# Patient Record
Sex: FEMALE | Race: WHITE | HISPANIC OR LATINO | Employment: OTHER | URBAN - METROPOLITAN AREA
[De-identification: names, ages, dates, MRNs, and addresses within clinical notes are randomized per-mention and may not be internally consistent; named-entity substitution may affect disease eponyms.]

---

## 2017-06-19 ENCOUNTER — TELEPHONE (OUTPATIENT)
Dept: VASCULAR SURGERY | Facility: CLINIC | Age: 77
End: 2017-06-19

## 2017-06-19 NOTE — TELEPHONE ENCOUNTER
----- Message from Chip Espinal sent at 6/19/2017 10:30 AM CDT -----  Contact: Mena- Daughter  Caller said [place] is going out of [place] and wants pt to see Dr. Espinosa for check up. Please call Mena at [phone]

## 2017-07-03 ENCOUNTER — TELEPHONE (OUTPATIENT)
Dept: VASCULAR SURGERY | Facility: CLINIC | Age: 77
End: 2017-07-03

## 2017-07-03 DIAGNOSIS — N18.6 END STAGE RENAL FAILURE ON DIALYSIS: Primary | ICD-10-CM

## 2017-07-03 DIAGNOSIS — Z99.2 END STAGE RENAL FAILURE ON DIALYSIS: Primary | ICD-10-CM

## 2017-07-03 NOTE — TELEPHONE ENCOUNTER
----- Message from Marlon Proctor sent at 7/3/2017  2:02 PM CDT -----  Contact: Mena//Daughter  Caller states that (s)he needs to speak with nurse in ref to scheduling an appt for the pt//please call back at [phone]//thank you

## 2017-07-14 ENCOUNTER — HOSPITAL ENCOUNTER (OUTPATIENT)
Dept: VASCULAR SURGERY | Facility: CLINIC | Age: 77
Discharge: HOME | End: 2017-07-14
Payer: MEDICARE

## 2017-07-14 ENCOUNTER — OFFICE VISIT (OUTPATIENT)
Dept: VASCULAR SURGERY | Facility: CLINIC | Age: 77
End: 2017-07-14
Payer: MEDICARE

## 2017-07-14 VITALS
RESPIRATION RATE: 18 BRPM | WEIGHT: 124.31 LBS | BODY MASS INDEX: 24.41 KG/M2 | HEIGHT: 60 IN | SYSTOLIC BLOOD PRESSURE: 161 MMHG | TEMPERATURE: 98 F | HEART RATE: 69 BPM | DIASTOLIC BLOOD PRESSURE: 66 MMHG

## 2017-07-14 DIAGNOSIS — Z01.818 PRE-OP EVALUATION: Primary | ICD-10-CM

## 2017-07-14 DIAGNOSIS — Z99.2 END STAGE RENAL FAILURE ON DIALYSIS: ICD-10-CM

## 2017-07-14 DIAGNOSIS — T82.858D STENOSIS OF ARTERIOVENOUS DIALYSIS FISTULA, SUBSEQUENT ENCOUNTER: ICD-10-CM

## 2017-07-14 DIAGNOSIS — I77.0 AVF (ARTERIOVENOUS FISTULA): Primary | ICD-10-CM

## 2017-07-14 DIAGNOSIS — N18.6 END STAGE RENAL FAILURE ON DIALYSIS: ICD-10-CM

## 2017-07-14 PROCEDURE — 99213 OFFICE O/P EST LOW 20 MIN: CPT | Mod: PBBFAC | Performed by: SURGERY

## 2017-07-14 PROCEDURE — 1126F AMNT PAIN NOTED NONE PRSNT: CPT | Mod: ,,, | Performed by: SURGERY

## 2017-07-14 PROCEDURE — 99999 PR PBB SHADOW E&M-EST. PATIENT-LVL III: CPT | Mod: PBBFAC,,, | Performed by: SURGERY

## 2017-07-14 PROCEDURE — 93990 DOPPLER FLOW TESTING: CPT | Mod: 26,S$PBB,, | Performed by: SURGERY

## 2017-07-14 PROCEDURE — 99214 OFFICE O/P EST MOD 30 MIN: CPT | Mod: S$PBB,,, | Performed by: SURGERY

## 2017-07-14 PROCEDURE — 1159F MED LIST DOCD IN RCRD: CPT | Mod: ,,, | Performed by: SURGERY

## 2017-07-14 RX ORDER — LIDOCAINE HYDROCHLORIDE 10 MG/ML
1 INJECTION, SOLUTION EPIDURAL; INFILTRATION; INTRACAUDAL; PERINEURAL ONCE
Status: CANCELLED | OUTPATIENT
Start: 2017-07-14 | End: 2017-07-14

## 2017-07-14 RX ORDER — AMLODIPINE BESYLATE 5 MG/1
TABLET ORAL
Refills: 1 | Status: ON HOLD | COMMUNITY
Start: 2017-04-11 | End: 2017-08-03

## 2017-07-14 NOTE — PROGRESS NOTES
Patient ID: Jerel Howard is a 76 y.o. female.    I. HISTORY     Chief Complaint: ESRD f/u    HPI     Ms. Howard is a 77 y/o F with ESRD s/p:    1.  Drug-eluting angioplasty, left femoral AV graft, 09/28/2016.  2.  Prior stent placement of left iliac artery, angioplasty of left SFA and left   femoral venous anastomotic stenosis, all 03/10/2014.  3.  Original left femoral AV graft placement, 03/12/2008.  4.  Recent left lower extremity arteriogram and tibioperoneal stent placement,   July 2016, after a TAVR (Dr. Dover).     She now returns.  She is  having increased bleeding      ROS    Current Outpatient Prescriptions on File Prior to Visit   Medication Sig Dispense Refill    amlodipine (NORVASC) 10 MG tablet 10 mg once daily.       apixaban (ELIQUIS) 2.5 mg Tab Take 5 mg by mouth 2 (two) times daily.      aspirin 81 MG Chew Take 1 tablet (81 mg total) by mouth once daily.  0    cinacalcet (SENSIPAR) 60 MG Tab Take 60 mg by mouth.      cloNIDine (CATAPRES) 0.2 MG tablet Take 0.5 tablets (0.1 mg total) by mouth 2 (two) times daily. 30 tablet 11    FOLIC ACID/VITAMIN B COMP W-C (TRIPHROCAPS ORAL) Take 1 capsule by mouth.      furosemide (LASIX) 40 MG tablet Take 1 tablet (40 mg total) by mouth 2 (two) times daily. (Patient taking differently: Take 80 mg by mouth once daily. 4x  weekly) 60 tablet 11    lidocaine-prilocaine (EMLA) cream       sevelamer carbonate (RENVELA) 800 mg Tab Take 1 tablet (800 mg total) by mouth 3 (three) times daily with meals. 90 tablet 11    valsartan (DIOVAN) 80 MG tablet TK 1 T PO  BID  6     No current facility-administered medications on file prior to visit.        Past Medical History:   Diagnosis Date    Dialysis care     Hypertension     Renal disorder     Seizures     Stroke     Thyroid disease        Family History   Problem Relation Age of Onset    Cancer Sister     Hypertension Brother      Social History     Social History    Marital status: Single     Spouse name:  N/A    Number of children: N/A    Years of education: N/A     Occupational History    Not on file.     Social History Main Topics    Smoking status: Never Smoker    Smokeless tobacco: Never Used    Alcohol use No    Drug use: No    Sexual activity: Not on file     Other Topics Concern    Not on file     Social History Narrative    No narrative on file       Review of patient's allergies indicates:  No Known Allergies    Family History   Problem Relation Age of Onset    Cancer Sister     Hypertension Brother        II. PHYSICAL EXAM     Physical Exam   GEN: NAD  CV: Regular rate  Chest: Symmetric non labored respirations  Abd: s/nt/nd  Ext:  L LE shows AVG with significant pulsatility  Neuro: AAOx3    III. ASSESSMENT & PLAN (MEDICAL DECISION MAKING)     Imaging Results:    AVG duplex shows decreased flow volume of 853 (from 1.3 L)    Assessment/Diagnosis and Plan: stenosis, L femoral AVG    PLAN:    L femoral fistualgram and intervention 7/19/17  Cath lab case    I have explained the risks, benefits and alternatives for this procedure in detail with the aid of her family member as .   The patients voices understanding and all questions have be answered, and agrees to proceed with the procedure.    CORA Espinosa III, MD, FACS  Professor and Chief, Vascular and Endovascular Surgery

## 2017-07-19 ENCOUNTER — HOSPITAL ENCOUNTER (OUTPATIENT)
Facility: HOSPITAL | Age: 77
Discharge: HOME | End: 2017-07-19
Attending: SURGERY | Admitting: SURGERY
Payer: MEDICARE

## 2017-07-19 VITALS
SYSTOLIC BLOOD PRESSURE: 148 MMHG | TEMPERATURE: 98 F | HEART RATE: 73 BPM | HEIGHT: 60 IN | BODY MASS INDEX: 24.54 KG/M2 | WEIGHT: 125 LBS | RESPIRATION RATE: 18 BRPM | DIASTOLIC BLOOD PRESSURE: 63 MMHG | OXYGEN SATURATION: 94 %

## 2017-07-19 DIAGNOSIS — I77.0 AVF (ARTERIOVENOUS FISTULA): Primary | ICD-10-CM

## 2017-07-19 PROCEDURE — 36907 BALO ANGIOP CTR DIALYSIS SEG: CPT | Mod: ,,, | Performed by: SURGERY

## 2017-07-19 PROCEDURE — 63600175 PHARM REV CODE 636 W HCPCS

## 2017-07-19 PROCEDURE — C1894 INTRO/SHEATH, NON-LASER: HCPCS

## 2017-07-19 PROCEDURE — 99152 MOD SED SAME PHYS/QHP 5/>YRS: CPT | Mod: ,,, | Performed by: SURGERY

## 2017-07-19 PROCEDURE — 25000003 PHARM REV CODE 250

## 2017-07-19 PROCEDURE — 36902 INTRO CATH DIALYSIS CIRCUIT: CPT | Mod: ,,, | Performed by: SURGERY

## 2017-07-19 RX ORDER — CEFAZOLIN SODIUM 2 G/50ML
2 SOLUTION INTRAVENOUS
Status: DISCONTINUED | OUTPATIENT
Start: 2017-07-19 | End: 2017-07-19 | Stop reason: HOSPADM

## 2017-07-19 RX ORDER — HYDROCODONE BITARTRATE AND ACETAMINOPHEN 5; 325 MG/1; MG/1
1 TABLET ORAL EVERY 4 HOURS PRN
Status: DISCONTINUED | OUTPATIENT
Start: 2017-07-19 | End: 2017-07-19 | Stop reason: HOSPADM

## 2017-07-19 RX ORDER — LIDOCAINE HYDROCHLORIDE 10 MG/ML
1 INJECTION, SOLUTION EPIDURAL; INFILTRATION; INTRACAUDAL; PERINEURAL ONCE
Status: DISCONTINUED | OUTPATIENT
Start: 2017-07-19 | End: 2017-07-19 | Stop reason: HOSPADM

## 2017-07-19 NOTE — PROGRESS NOTES
Pt returned to unit AAOX4 and denies pain.  L femoral fistula site is c/d/i and soft without redness or swelling.  Pedal pulses are palpable bilaterally.  Family member called to bedside.  Will continue to monitor.

## 2017-07-19 NOTE — OP NOTE
DATE OF PROCEDURE:  07/19/2017.    PREOPERATIVE DIAGNOSIS:  Stenosis, left femoral AV graft.    POSTOPERATIVE DIAGNOSIS:  Stenosis, left femoral AV graft.    PROCEDURES PERFORMED:  1.  Angioplasty, right femoral AV graft (7 x 40 [employer]).  2.  Angioplasty, outflow femoral vein (9 x 40 [employer]).  3.  Fistulogram.  4.  Conscious sedation.    SURGEON:  CORA Espinosa III, M.D.    ASSISTANT:  Con Solorzano M.D. (RES).    ANESTHESIA:  RN-directed sedation and local infiltration.    INDICATIONS FOR PROCEDURE:  A 76-year-old female with end-stage renal disease,   well known to me, who presents with increased bleeding and decreased flow in her   graft by duplex suggested by outflow stenosis.    PROCEDURE IN DETAIL:  The patient brought in the Cath Lab and placed in supine   position.  After sterile prep and drape and infiltration of 1% lidocaine, a   micropuncture set was used to access the graft proximally.  Fistulogram revealed   a 75% mid graft stenosis, 50% distal third stenosis, 75% in-stent stenosis near   the venous anastomosis and greater than 90% stenosis of the femoral vein   approximately 1-2 cm past the anastomosis.  A stiff angled Glidewire was passed   actually with some difficulty for the last lesion.  Initially, all lesions were   dilated with a 7 x 40 [employer] balloon at 20 atmospheres, each held for 90   seconds.  There was no residual stenosis in the stent or graft stenoses.    However, there was some residual stenosis in the femoral vein, although much   improved.    The femoral vein was then angioplastied with a larger balloon, a 9 x 40 [employer]   held for 90 seconds at 15 atmospheres.  Final completion films showed brisk flow   and resolution of stenosis.    All catheters and guidewires were removed and hemostasis was achieved with a   stitch.  Sterile dressing was applied and the patient was taken in stable   condition to the PACU.    Of note, I continuously monitored the cardiopulmonary  functions throughout the   case.  See nursing notes for dosing of Versed and fentanyl.  Total conscious   sedation time was 37 minutes.      SANDRA/JENNIFER  dd: 07/19/2017 12:06:50 (CDT)  td: 07/19/2017 12:37:30 (CDT)  Doc ID   #[id number]  Job ID #[id number]    CC:

## 2017-07-19 NOTE — INTERVAL H&P NOTE
The patient has been examined and the H&P has been reviewed:    I concur with the findings and no changes have occurred since H&P was written.    Anesthesia/Surgery risks, benefits and alternative options discussed and understood by patient/family.          Active Hospital Problems    Diagnosis  POA    AVF (arteriovenous fistula) [I77.0]  Yes      Resolved Hospital Problems    Diagnosis Date Resolved POA   No resolved problems to display.

## 2017-07-19 NOTE — BRIEF OP NOTE
[facility]  Brief Operative Note     SUMMARY     Surgery Date: 7/19/2017     Surgeon(s) and Role:     * CEE Espinosa III, MD - Primary    Assisting Surgeon: ROSALBA au    Pre-op Diagnosis: Stenosis, AVG     Post-op Diagnosis:  Same      PROCEDURES:  1. PTA, L femoral AVG (7x40 [place])  2. PTA, outflow femoral vein (9x40)  3. L fistulagram  4. Conscious Sedation    Anesthesia: RN IV Sedation    Description of the findings of the procedure: >90% femoral vein stenosis, 75% ISR and midgraft stenosis, <20% residual    Findings/Key Components: as above    Estimated Blood Loss: 10cc         Specimens:   Specimen (12h ago through future)    None          Discharge Note    SUMMARY     Admit Date: 7/19/2017    Discharge Date and Time: 7/19/17    Hospital Course (synopsis of major diagnoses, care, treatment, and services provided during the course of the hospital stay): successful outpatient procedure    Final Diagnosis: stenosis, AVG    Disposition: home    Follow Up/Patient Instructions: Diet: renal  Act: ad alee  FU: 1 week with AVF duplex     Medications: pre-op      Discharge Procedure Orders  VAS US Hemodialysis Access   Standing Status: Future  Standing Exp. Date: 07/19/18     Diet general     Activity as tolerated     Call MD for:  extreme fatigue     Call MD for:  persistent dizziness or light-headedness     Call MD for:  hives     Call MD for:  redness, tenderness, or signs of infection (pain, swelling, redness, odor or green/yellow discharge around incision site)     Call MD for:  difficulty breathing, headache or visual disturbances     Call MD for:  severe uncontrolled pain     Call MD for:  persistent nausea and vomiting     Call MD for:  temperature >100.4     Remove dressing in 48 hours     REASON FOR NOT PRESCRIBING STATIN MEDICATION AT DISCHARGE     REASON FOR NOT PRESCRIBING ANTIPLATELET MEDICATION AT DISCHARGE   Order Specific Question Answer Comments   Reason for not Prescribing:  Other (Comment) on aspirin        Follow-up Information     W Angel Espinosa Iii, MD On 7/28/2017.    Specialty:  Vascular Surgery  Contact information:  Nancy CARRILLO FELICIA  [facility] [id number] [phone]

## 2017-07-19 NOTE — NURSING
Pt is AAOx3 and in no apparent distress.  L femoral fistula site c/d/i.  Provided a copy of discharge instructions.  Teaching performed.  Pt verbalized understanding and denied any questions.  PIV d/c catheter tip intact.  2x2 applied and no active bleeding noted.  Pt's family member is going to transport pt out for discharge home in her own wheelchair.

## 2017-07-28 ENCOUNTER — HOSPITAL ENCOUNTER (OUTPATIENT)
Dept: VASCULAR SURGERY | Facility: CLINIC | Age: 77
Discharge: HOME | End: 2017-07-28
Attending: STUDENT IN AN ORGANIZED HEALTH CARE EDUCATION/TRAINING PROGRAM
Payer: MEDICARE

## 2017-07-28 ENCOUNTER — OFFICE VISIT (OUTPATIENT)
Dept: VASCULAR SURGERY | Facility: CLINIC | Age: 77
End: 2017-07-28
Payer: MEDICARE

## 2017-07-28 VITALS
TEMPERATURE: 98 F | BODY MASS INDEX: 24.54 KG/M2 | SYSTOLIC BLOOD PRESSURE: 131 MMHG | DIASTOLIC BLOOD PRESSURE: 66 MMHG | HEIGHT: 60 IN | WEIGHT: 125 LBS | HEART RATE: 70 BPM

## 2017-07-28 DIAGNOSIS — T82.590D MALFUNCTION OF ARTERIOVENOUS DIALYSIS FISTULA, SUBSEQUENT ENCOUNTER: ICD-10-CM

## 2017-07-28 DIAGNOSIS — T82.9XXA COMPLICATION OF VASCULAR ACCESS FOR DIALYSIS, INITIAL ENCOUNTER: ICD-10-CM

## 2017-07-28 DIAGNOSIS — N18.6 END STAGE RENAL DISEASE: ICD-10-CM

## 2017-07-28 DIAGNOSIS — I77.0 AVF (ARTERIOVENOUS FISTULA): ICD-10-CM

## 2017-07-28 DIAGNOSIS — Z99.2 DEPENDENCE ON HEMODIALYSIS: ICD-10-CM

## 2017-07-28 DIAGNOSIS — T82.9XXA COMPLICATIONS DUE TO RENAL DIALYSIS DEVICE, IMPLANT, AND GRAFT, INITIAL ENCOUNTER: Primary | ICD-10-CM

## 2017-07-28 PROCEDURE — 99214 OFFICE O/P EST MOD 30 MIN: CPT | Mod: S$PBB,,, | Performed by: SURGERY

## 2017-07-28 PROCEDURE — 1159F MED LIST DOCD IN RCRD: CPT | Mod: ,,, | Performed by: SURGERY

## 2017-07-28 PROCEDURE — 99999 PR PBB SHADOW E&M-EST. PATIENT-LVL III: CPT | Mod: PBBFAC,,, | Performed by: SURGERY

## 2017-07-28 PROCEDURE — 99213 OFFICE O/P EST LOW 20 MIN: CPT | Mod: PBBFAC,25 | Performed by: SURGERY

## 2017-07-28 PROCEDURE — 93990 DOPPLER FLOW TESTING: CPT | Mod: 26,S$PBB,, | Performed by: SURGERY

## 2017-07-28 PROCEDURE — 1126F AMNT PAIN NOTED NONE PRSNT: CPT | Mod: ,,, | Performed by: SURGERY

## 2017-07-28 RX ORDER — LIDOCAINE HYDROCHLORIDE 10 MG/ML
1 INJECTION, SOLUTION EPIDURAL; INFILTRATION; INTRACAUDAL; PERINEURAL ONCE
Status: CANCELLED | OUTPATIENT
Start: 2017-07-28 | End: 2017-07-28

## 2017-07-28 RX ORDER — SODIUM CHLORIDE 9 MG/ML
INJECTION, SOLUTION INTRAVENOUS CONTINUOUS
Status: CANCELLED | OUTPATIENT
Start: 2017-07-28

## 2017-07-28 NOTE — PROGRESS NOTES
Patient ID: Jerel Howard is a 76 y.o. female.    I. HISTORY     Chief Complaint: ESRD f/u    HPI Jerel Howard 76 y.o.  with ESRD on dialysis s/p PTA Right femoral graft (see details in procedures below) on 7/19/17.  Pt. Is Tuvaluan speaking only and here with her daughter who interprets for her.  Of note, the office was called while patient was in the vascular lab about 5 minutes ago with report that an area began squirting blood during the vascular ultrasound.  Pressure was applied in the lab.  Patient now presents to the clinic holding pressure to the left Leg AVG.  Pt. Reports that the area that is bleeding is around the area that was accessed for dialysis yesterday. Pt. Reports that there have been no issues during dialysis or with prolonged bleeding since the last intervention on 7/19/17.  She dialyzes T, H, S.     1.  Angioplasty, right femoral AV graft (7 x 40 [place]), outflow femoral vein (9 x 40 [place]), 7/19/17  2.   Drug-eluting angioplasty, left femoral AV graft, 09/28/2016.  3.  Prior stent placement of left iliac artery, angioplasty of left SFA and left   femoral venous anastomotic stenosis, all 03/10/2014.  4.  Original left femoral AV graft placement, 03/12/2008.  5.  Recent left lower extremity arteriogram and tibioperoneal stent placement,   July 2016, after a TAVR (Dr. Dover).       Review of Systems   Constitution: Negative for chills, decreased appetite, fever, weakness, weight gain and weight loss.   HENT: Negative for headaches.    Eyes: Negative for blurred vision.   Cardiovascular: Negative for chest pain, claudication, leg swelling and syncope.   Respiratory: Negative for cough and shortness of breath.    Hematologic/Lymphatic: Negative for bleeding problem. Does not bruise/bleed easily.   Skin: Negative for rash.   Musculoskeletal: Negative for joint pain, muscle cramps and muscle weakness.   Gastrointestinal: Negative for abdominal pain, change in bowel habit, diarrhea, nausea and vomiting.    Neurological: Negative for numbness and paresthesias.   Psychiatric/Behavioral: Negative for altered mental status.       Current Outpatient Prescriptions on File Prior to Visit   Medication Sig Dispense Refill    amlodipine (NORVASC) 10 MG tablet 10 mg once daily.       amlodipine (NORVASC) 5 MG tablet   1    apixaban (ELIQUIS) 2.5 mg Tab Take 5 mg by mouth 2 (two) times daily.      aspirin 81 MG Chew Take 1 tablet (81 mg total) by mouth once daily.  0    cinacalcet (SENSIPAR) 60 MG Tab Take 60 mg by mouth.      cloNIDine (CATAPRES) 0.2 MG tablet Take 0.5 tablets (0.1 mg total) by mouth 2 (two) times daily. 30 tablet 11    FOLIC ACID/VITAMIN B COMP W-C (TRIPHROCAPS ORAL) Take 1 capsule by mouth.      furosemide (LASIX) 40 MG tablet Take 1 tablet (40 mg total) by mouth 2 (two) times daily. (Patient taking differently: Take 80 mg by mouth once daily. 4x  weekly) 60 tablet 11    lidocaine-prilocaine (EMLA) cream       sevelamer carbonate (RENVELA) 800 mg Tab Take 1 tablet (800 mg total) by mouth 3 (three) times daily with meals. 90 tablet 11    valsartan (DIOVAN) 80 MG tablet TK 1 T PO  BID  6     No current facility-administered medications on file prior to visit.        Past Medical History:   Diagnosis Date    Dialysis care     Hypertension     Renal disorder     Seizures     Stroke     Thyroid disease        Family History   Problem Relation Age of Onset    Cancer Sister     Hypertension Brother      Social History     Social History    Marital status: Single     Spouse name: N/A    Number of children: N/A    Years of education: N/A     Occupational History    Not on file.     Social History Main Topics    Smoking status: Never Smoker    Smokeless tobacco: Never Used    Alcohol use No    Drug use: No    Sexual activity: Not on file     Other Topics Concern    Not on file     Social History Narrative    No narrative on file       Review of patient's allergies indicates:  No Known  Allergies    Family History   Problem Relation Age of Onset    Cancer Sister     Hypertension Brother        II. PHYSICAL EXAM     Physical Exam   Constitutional: She is oriented to person, place, and time. She appears well-developed and well-nourished. No distress.   HENT:   Head: Normocephalic and atraumatic.   Eyes: Conjunctivae, EOM and lids are normal.   Neck: Neck supple.   Cardiovascular: Intact distal pulses.    Pulmonary/Chest: Effort normal. No stridor. No respiratory distress. She has no wheezes.   Abdominal: Soft. She exhibits no distension. There is no tenderness.   Musculoskeletal: Normal range of motion.        Left upper leg: She exhibits no tenderness and no swelling.   Left Leg AVG with good thrill, minimal pulsatility.   Area along medial area with 4mm area with ulceration noted, not actively bleeding at this time.     Neurological: She is alert and oriented to person, place, and time. She exhibits normal muscle tone. Coordination normal.   Skin: Skin is warm and dry. No rash noted. No erythema.   Psychiatric: She has a normal mood and affect.   Vitals reviewed.     III. ASSESSMENT & PLAN (MEDICAL DECISION MAKING)     Imaging Results:    AVG duplex shows decreased flow volume of 1.8 L  With no evidence of hemodynamically significant stenosis.     1. Complications due to renal dialysis device, implant, and graft, initial encounter  Case Request Operating Room: GRAFT-ARTERIOVENOUS left leg- Revision and replacement    Place in Outpatient    Height and weight    Verify surgical site    Verify informed consent    If patient is going home post procedure, verify arrangements are made for a     Void on call to OR    Insert and maintain IV access    Notify Physician/Vital Signs Parameters    Vital signs   2. Complication of vascular access for dialysis, initial encounter           Assessment/Diagnosis and Plan: Bleeding Ageing Left leg AVG     PLAN:  Stitch placed to area of ulceration on left leg  AVG; pt. Instructed to return ER if there are any issues with bleeding  L femoral AVG replacement and revision scheduled for 8/2/17  Hold Eliquis 3 days prior to scheduled procedure    MARYLOU Castaneda, APRN, FNP-BC  Nurse Practitioner  Vascular and Endovascular Surgery'    VASCULAR STAFF    I have personally taken the history and examined this patient and agree with the resident's note as stated above    Will plan on a body revision of the L femoral AVG with an Accuseal      I have explained the risks, benefits and alternatives for this procedure in detail with the aid of her family member as .   The patients voices understanding and all questions have be answered, and agrees to proceed with the procedure.    CORA Espinosa III, MD, FACS  Professor and Chief, Vascular and Endovascular Surgery

## 2017-08-01 ENCOUNTER — ANESTHESIA EVENT (OUTPATIENT)
Dept: SURGERY | Facility: HOSPITAL | Age: 77
DRG: 252 | End: 2017-08-01
Payer: MEDICARE

## 2017-08-01 ENCOUNTER — TELEPHONE (OUTPATIENT)
Dept: VASCULAR SURGERY | Facility: CLINIC | Age: 77
End: 2017-08-01

## 2017-08-02 ENCOUNTER — HOSPITAL ENCOUNTER (INPATIENT)
Facility: HOSPITAL | Age: 77
LOS: 1 days | Discharge: HOME | DRG: 252 | End: 2017-08-03
Attending: SURGERY | Admitting: SURGERY
Payer: MEDICARE

## 2017-08-02 ENCOUNTER — ANESTHESIA (OUTPATIENT)
Dept: SURGERY | Facility: HOSPITAL | Age: 77
DRG: 252 | End: 2017-08-02
Payer: MEDICARE

## 2017-08-02 DIAGNOSIS — I47.10 SVT (SUPRAVENTRICULAR TACHYCARDIA): ICD-10-CM

## 2017-08-02 DIAGNOSIS — I48.91 ATRIAL FIBRILLATION WITH RAPID VENTRICULAR RESPONSE: Primary | ICD-10-CM

## 2017-08-02 DIAGNOSIS — I50.33 ACUTE ON CHRONIC DIASTOLIC CHF (CONGESTIVE HEART FAILURE): ICD-10-CM

## 2017-08-02 DIAGNOSIS — N18.6 ESRD ON DIALYSIS: ICD-10-CM

## 2017-08-02 DIAGNOSIS — T82.9XXA COMPLICATION OF VASCULAR ACCESS FOR DIALYSIS, INITIAL ENCOUNTER: ICD-10-CM

## 2017-08-02 DIAGNOSIS — J96.01 ACUTE RESPIRATORY FAILURE WITH HYPOXEMIA: ICD-10-CM

## 2017-08-02 DIAGNOSIS — I48.91 ATRIAL FIBRILLATION: ICD-10-CM

## 2017-08-02 DIAGNOSIS — Z99.2 ESRD ON DIALYSIS: ICD-10-CM

## 2017-08-02 DIAGNOSIS — R94.31 ST SEGMENT DEPRESSION: ICD-10-CM

## 2017-08-02 DIAGNOSIS — I48.91 NEW ONSET ATRIAL FIBRILLATION: ICD-10-CM

## 2017-08-02 DIAGNOSIS — J96.01 ACUTE HYPOXEMIC RESPIRATORY FAILURE: ICD-10-CM

## 2017-08-02 DIAGNOSIS — T82.9XXA COMPLICATIONS DUE TO RENAL DIALYSIS DEVICE, IMPLANT, AND GRAFT, INITIAL ENCOUNTER: ICD-10-CM

## 2017-08-02 DIAGNOSIS — I48.91 A-FIB: ICD-10-CM

## 2017-08-02 DIAGNOSIS — I48.91 ATRIAL FIBRILLATION WITH RVR: ICD-10-CM

## 2017-08-02 LAB
ANION GAP SERPL CALC-SCNC: 16 MMOL/L
BASOPHILS # BLD AUTO: 0.02 K/UL
BASOPHILS NFR BLD: 0.2 %
BNP SERPL-MCNC: 1100 PG/ML
BUN SERPL-MCNC: 28 MG/DL
CALCIUM SERPL-MCNC: 9.5 MG/DL
CHLORIDE SERPL-SCNC: 98 MMOL/L
CO2 SERPL-SCNC: 24 MMOL/L
CREAT SERPL-MCNC: 5.9 MG/DL
D DIMER PPP IA.FEU-MCNC: 1.33 MG/L FEU
DIFFERENTIAL METHOD: ABNORMAL
EOSINOPHIL # BLD AUTO: 0 K/UL
EOSINOPHIL NFR BLD: 0.3 %
ERYTHROCYTE [DISTWIDTH] IN BLOOD BY AUTOMATED COUNT: 12.6 %
EST. GFR  (AFRICAN AMERICAN): 7.5 ML/MIN/1.73 M^2
EST. GFR  (NON AFRICAN AMERICAN): 6.5 ML/MIN/1.73 M^2
GLUCOSE SERPL-MCNC: 140 MG/DL
GLUCOSE SERPL-MCNC: 148 MG/DL (ref 70–110)
HCO3 UR-SCNC: 29.9 MMOL/L (ref 24–28)
HCT VFR BLD AUTO: 35.2 %
HCT VFR BLD CALC: 40 %PCV (ref 36–54)
HGB BLD-MCNC: 11.8 G/DL
LYMPHOCYTES # BLD AUTO: 1.4 K/UL
LYMPHOCYTES NFR BLD: 14.3 %
MAGNESIUM SERPL-MCNC: 2.6 MG/DL
MCH RBC QN AUTO: 34.3 PG
MCHC RBC AUTO-ENTMCNC: 33.5 G/DL
MCV RBC AUTO: 102 FL
MONOCYTES # BLD AUTO: 0.3 K/UL
MONOCYTES NFR BLD: 3.1 %
NEUTROPHILS # BLD AUTO: 7.8 K/UL
NEUTROPHILS NFR BLD: 81.6 %
PCO2 BLDA: 57.5 MMHG (ref 35–45)
PH SMN: 7.33 [PH] (ref 7.35–7.45)
PLATELET # BLD AUTO: 182 K/UL
PMV BLD AUTO: 10.8 FL
PO2 BLDA: 60 MMHG (ref 40–60)
POC BE: 4 MMOL/L
POC IONIZED CALCIUM: 1.19 MMOL/L (ref 1.06–1.42)
POC SATURATED O2: 88 % (ref 95–100)
POC TCO2: 32 MMOL/L (ref 24–29)
POTASSIUM BLD-SCNC: 5.3 MMOL/L (ref 3.5–5.1)
POTASSIUM SERPL-SCNC: 5.2 MMOL/L
RBC # BLD AUTO: 3.44 M/UL
SAMPLE: ABNORMAL
SODIUM BLD-SCNC: 138 MMOL/L (ref 136–145)
SODIUM SERPL-SCNC: 138 MMOL/L
T4 FREE SERPL-MCNC: 1.17 NG/DL
TROPONIN I SERPL DL<=0.01 NG/ML-MCNC: 0.07 NG/ML
TSH SERPL DL<=0.005 MIU/L-ACNC: 12.27 UIU/ML
WBC # BLD AUTO: 9.58 K/UL

## 2017-08-02 PROCEDURE — 63600175 PHARM REV CODE 636 W HCPCS: Performed by: NURSE PRACTITIONER

## 2017-08-02 PROCEDURE — 84443 ASSAY THYROID STIM HORMONE: CPT

## 2017-08-02 PROCEDURE — 63600175 PHARM REV CODE 636 W HCPCS: Performed by: NURSE ANESTHETIST, CERTIFIED REGISTERED

## 2017-08-02 PROCEDURE — 27000221 HC OXYGEN, UP TO 24 HOURS

## 2017-08-02 PROCEDURE — 25000003 PHARM REV CODE 250: Performed by: NURSE PRACTITIONER

## 2017-08-02 PROCEDURE — 37000008 HC ANESTHESIA 1ST 15 MINUTES: Performed by: SURGERY

## 2017-08-02 PROCEDURE — 94761 N-INVAS EAR/PLS OXIMETRY MLT: CPT

## 2017-08-02 PROCEDURE — G0378 HOSPITAL OBSERVATION PER HR: HCPCS

## 2017-08-02 PROCEDURE — 63600175 PHARM REV CODE 636 W HCPCS: Performed by: SURGERY

## 2017-08-02 PROCEDURE — 25000003 PHARM REV CODE 250: Performed by: NURSE ANESTHETIST, CERTIFIED REGISTERED

## 2017-08-02 PROCEDURE — 04PY0JZ REMOVAL OF SYNTHETIC SUBSTITUTE FROM LOWER ARTERY, OPEN APPROACH: ICD-10-PCS | Performed by: SURGERY

## 2017-08-02 PROCEDURE — 25000003 PHARM REV CODE 250: Performed by: HOSPITALIST

## 2017-08-02 PROCEDURE — 25000003 PHARM REV CODE 250: Performed by: ANESTHESIOLOGY

## 2017-08-02 PROCEDURE — 93010 ELECTROCARDIOGRAM REPORT: CPT | Mod: ,,, | Performed by: INTERNAL MEDICINE

## 2017-08-02 PROCEDURE — 27201037 HC PRESSURE MONITORING SET UP

## 2017-08-02 PROCEDURE — C1768 GRAFT, VASCULAR: HCPCS | Performed by: SURGERY

## 2017-08-02 PROCEDURE — 99222 1ST HOSP IP/OBS MODERATE 55: CPT | Mod: GC,,, | Performed by: INTERNAL MEDICINE

## 2017-08-02 PROCEDURE — 36830 ARTERY-VEIN NONAUTOGRAFT: CPT | Mod: GC,,, | Performed by: SURGERY

## 2017-08-02 PROCEDURE — 99220 PR INITIAL OBSERVATION CARE,LEVL III: CPT | Mod: ,,, | Performed by: HOSPITALIST

## 2017-08-02 PROCEDURE — 041L0JS BYPASS LEFT FEMORAL ARTERY TO LOWER EXTREMITY VEIN WITH SYNTHETIC SUBSTITUTE, OPEN APPROACH: ICD-10-PCS | Performed by: SURGERY

## 2017-08-02 PROCEDURE — 25000003 PHARM REV CODE 250: Performed by: INTERNAL MEDICINE

## 2017-08-02 PROCEDURE — 37000009 HC ANESTHESIA EA ADD 15 MINS: Performed by: SURGERY

## 2017-08-02 PROCEDURE — 83735 ASSAY OF MAGNESIUM: CPT

## 2017-08-02 PROCEDURE — 84484 ASSAY OF TROPONIN QUANT: CPT

## 2017-08-02 PROCEDURE — D9220A PRA ANESTHESIA: Mod: ANES,,, | Performed by: ANESTHESIOLOGY

## 2017-08-02 PROCEDURE — 85025 COMPLETE CBC W/AUTO DIFF WBC: CPT

## 2017-08-02 PROCEDURE — 84439 ASSAY OF FREE THYROXINE: CPT

## 2017-08-02 PROCEDURE — 11000001 HC ACUTE MED/SURG PRIVATE ROOM

## 2017-08-02 PROCEDURE — 36000707: Performed by: SURGERY

## 2017-08-02 PROCEDURE — D9220A PRA ANESTHESIA: Mod: CRNA,,, | Performed by: NURSE ANESTHETIST, CERTIFIED REGISTERED

## 2017-08-02 PROCEDURE — 85379 FIBRIN DEGRADATION QUANT: CPT

## 2017-08-02 PROCEDURE — 71000039 HC RECOVERY, EACH ADD'L HOUR: Performed by: SURGERY

## 2017-08-02 PROCEDURE — 80048 BASIC METABOLIC PNL TOTAL CA: CPT

## 2017-08-02 PROCEDURE — 36000706: Performed by: SURGERY

## 2017-08-02 PROCEDURE — 63600175 PHARM REV CODE 636 W HCPCS

## 2017-08-02 PROCEDURE — 63600175 PHARM REV CODE 636 W HCPCS: Performed by: ANESTHESIOLOGY

## 2017-08-02 PROCEDURE — 83880 ASSAY OF NATRIURETIC PEPTIDE: CPT

## 2017-08-02 PROCEDURE — 25000003 PHARM REV CODE 250: Performed by: STUDENT IN AN ORGANIZED HEALTH CARE EDUCATION/TRAINING PROGRAM

## 2017-08-02 PROCEDURE — 93005 ELECTROCARDIOGRAM TRACING: CPT

## 2017-08-02 PROCEDURE — 71000033 HC RECOVERY, INTIAL HOUR: Performed by: SURGERY

## 2017-08-02 DEVICE — GRAFT ACUSEAL VASC 6MMX40CM: Type: IMPLANTABLE DEVICE | Site: LEG | Status: FUNCTIONAL

## 2017-08-02 RX ORDER — MIDAZOLAM HYDROCHLORIDE 1 MG/ML
INJECTION, SOLUTION INTRAMUSCULAR; INTRAVENOUS
Status: DISCONTINUED | OUTPATIENT
Start: 2017-08-02 | End: 2017-08-02

## 2017-08-02 RX ORDER — HYDROCODONE BITARTRATE AND ACETAMINOPHEN 5; 325 MG/1; MG/1
1 TABLET ORAL EVERY 4 HOURS PRN
Status: DISCONTINUED | OUTPATIENT
Start: 2017-08-02 | End: 2017-08-03 | Stop reason: HOSPADM

## 2017-08-02 RX ORDER — GLUCAGON 1 MG
1 KIT INJECTION
Status: CANCELLED | OUTPATIENT
Start: 2017-08-02

## 2017-08-02 RX ORDER — HYDRALAZINE HYDROCHLORIDE 20 MG/ML
INJECTION INTRAMUSCULAR; INTRAVENOUS
Status: DISCONTINUED | OUTPATIENT
Start: 2017-08-02 | End: 2017-08-02

## 2017-08-02 RX ORDER — PROTAMINE SULFATE 10 MG/ML
INJECTION, SOLUTION INTRAVENOUS
Status: DISCONTINUED | OUTPATIENT
Start: 2017-08-02 | End: 2017-08-02

## 2017-08-02 RX ORDER — CISATRACURIUM BESYLATE 10 MG/ML
INJECTION, SOLUTION INTRAVENOUS
Status: DISCONTINUED | OUTPATIENT
Start: 2017-08-02 | End: 2017-08-02

## 2017-08-02 RX ORDER — NEOSTIGMINE METHYLSULFATE 1 MG/ML
INJECTION, SOLUTION INTRAVENOUS
Status: DISCONTINUED | OUTPATIENT
Start: 2017-08-02 | End: 2017-08-02

## 2017-08-02 RX ORDER — ONDANSETRON 2 MG/ML
INJECTION INTRAMUSCULAR; INTRAVENOUS
Status: COMPLETED
Start: 2017-08-02 | End: 2017-08-02

## 2017-08-02 RX ORDER — RAMELTEON 8 MG/1
8 TABLET ORAL NIGHTLY PRN
Status: CANCELLED | OUTPATIENT
Start: 2017-08-02

## 2017-08-02 RX ORDER — ETOMIDATE 2 MG/ML
INJECTION INTRAVENOUS
Status: DISCONTINUED | OUTPATIENT
Start: 2017-08-02 | End: 2017-08-02

## 2017-08-02 RX ORDER — GLYCOPYRROLATE 0.2 MG/ML
INJECTION INTRAMUSCULAR; INTRAVENOUS
Status: DISCONTINUED | OUTPATIENT
Start: 2017-08-02 | End: 2017-08-02

## 2017-08-02 RX ORDER — SODIUM CHLORIDE 0.9 % (FLUSH) 0.9 %
3 SYRINGE (ML) INJECTION
Status: DISCONTINUED | OUTPATIENT
Start: 2017-08-02 | End: 2017-08-03 | Stop reason: HOSPADM

## 2017-08-02 RX ORDER — HEPARIN SODIUM 1000 [USP'U]/ML
INJECTION, SOLUTION INTRAVENOUS; SUBCUTANEOUS
Status: DISCONTINUED | OUTPATIENT
Start: 2017-08-02 | End: 2017-08-02

## 2017-08-02 RX ORDER — LIDOCAINE HYDROCHLORIDE 10 MG/ML
1 INJECTION, SOLUTION EPIDURAL; INFILTRATION; INTRACAUDAL; PERINEURAL ONCE
Status: COMPLETED | OUTPATIENT
Start: 2017-08-02 | End: 2017-08-02

## 2017-08-02 RX ORDER — ACETAMINOPHEN 325 MG/1
650 TABLET ORAL ONCE AS NEEDED
Status: COMPLETED | OUTPATIENT
Start: 2017-08-02 | End: 2017-08-02

## 2017-08-02 RX ORDER — CINACALCET 60 MG/1
60 TABLET, FILM COATED ORAL
Status: DISCONTINUED | OUTPATIENT
Start: 2017-08-03 | End: 2017-08-03 | Stop reason: HOSPADM

## 2017-08-02 RX ORDER — ATORVASTATIN CALCIUM 20 MG/1
40 TABLET, FILM COATED ORAL NIGHTLY
Status: DISCONTINUED | OUTPATIENT
Start: 2017-08-03 | End: 2017-08-03 | Stop reason: HOSPADM

## 2017-08-02 RX ORDER — SEVELAMER CARBONATE 800 MG/1
800 TABLET, FILM COATED ORAL
Status: DISCONTINUED | OUTPATIENT
Start: 2017-08-02 | End: 2017-08-03

## 2017-08-02 RX ORDER — IBUPROFEN 200 MG
24 TABLET ORAL
Status: CANCELLED | OUTPATIENT
Start: 2017-08-02

## 2017-08-02 RX ORDER — ESMOLOL HYDROCHLORIDE 10 MG/ML
INJECTION INTRAVENOUS
Status: DISCONTINUED | OUTPATIENT
Start: 2017-08-02 | End: 2017-08-02

## 2017-08-02 RX ORDER — IBUPROFEN 200 MG
16 TABLET ORAL
Status: CANCELLED | OUTPATIENT
Start: 2017-08-02

## 2017-08-02 RX ORDER — PANTOPRAZOLE SODIUM 40 MG/1
40 TABLET, DELAYED RELEASE ORAL DAILY
COMMUNITY

## 2017-08-02 RX ORDER — HYDROCODONE BITARTRATE AND ACETAMINOPHEN 5; 325 MG/1; MG/1
1-2 TABLET ORAL EVERY 4 HOURS PRN
Qty: 31 TABLET | Refills: 0 | Status: ON HOLD | OUTPATIENT
Start: 2017-08-02 | End: 2017-11-06 | Stop reason: HOSPADM

## 2017-08-02 RX ORDER — PHENYLEPHRINE HYDROCHLORIDE 10 MG/ML
INJECTION INTRAVENOUS
Status: DISCONTINUED | OUTPATIENT
Start: 2017-08-02 | End: 2017-08-02

## 2017-08-02 RX ORDER — AMLODIPINE BESYLATE 10 MG/1
10 TABLET ORAL DAILY
Status: DISCONTINUED | OUTPATIENT
Start: 2017-08-03 | End: 2017-08-03 | Stop reason: HOSPADM

## 2017-08-02 RX ORDER — PANTOPRAZOLE SODIUM 40 MG/1
40 TABLET, DELAYED RELEASE ORAL DAILY
Status: DISCONTINUED | OUTPATIENT
Start: 2017-08-03 | End: 2017-08-03 | Stop reason: HOSPADM

## 2017-08-02 RX ORDER — HEPARIN SODIUM 1000 [USP'U]/ML
INJECTION, SOLUTION INTRAVENOUS; SUBCUTANEOUS
Status: DISCONTINUED | OUTPATIENT
Start: 2017-08-02 | End: 2017-08-02 | Stop reason: HOSPADM

## 2017-08-02 RX ORDER — SODIUM CHLORIDE 9 MG/ML
INJECTION, SOLUTION INTRAVENOUS CONTINUOUS
Status: DISCONTINUED | OUTPATIENT
Start: 2017-08-02 | End: 2017-08-03 | Stop reason: HOSPADM

## 2017-08-02 RX ORDER — ACETAMINOPHEN 325 MG/1
TABLET ORAL
Status: DISCONTINUED
Start: 2017-08-02 | End: 2017-08-02 | Stop reason: WASHOUT

## 2017-08-02 RX ORDER — VALSARTAN 80 MG/1
80 TABLET ORAL 2 TIMES DAILY
Status: DISCONTINUED | OUTPATIENT
Start: 2017-08-02 | End: 2017-08-03 | Stop reason: HOSPADM

## 2017-08-02 RX ORDER — CLONIDINE HYDROCHLORIDE 0.1 MG/1
0.1 TABLET ORAL 2 TIMES DAILY
Status: DISCONTINUED | OUTPATIENT
Start: 2017-08-02 | End: 2017-08-03 | Stop reason: HOSPADM

## 2017-08-02 RX ORDER — FENTANYL CITRATE 50 UG/ML
INJECTION, SOLUTION INTRAMUSCULAR; INTRAVENOUS
Status: DISCONTINUED | OUTPATIENT
Start: 2017-08-02 | End: 2017-08-02

## 2017-08-02 RX ORDER — METOPROLOL TARTRATE 25 MG/1
25 TABLET, FILM COATED ORAL 2 TIMES DAILY
Status: DISCONTINUED | OUTPATIENT
Start: 2017-08-02 | End: 2017-08-03 | Stop reason: HOSPADM

## 2017-08-02 RX ADMIN — FENTANYL CITRATE 50 MCG: 50 INJECTION, SOLUTION INTRAMUSCULAR; INTRAVENOUS at 08:08

## 2017-08-02 RX ADMIN — HYDRALAZINE HYDROCHLORIDE 2.5 MG: 20 INJECTION INTRAMUSCULAR; INTRAVENOUS at 09:08

## 2017-08-02 RX ADMIN — FENTANYL CITRATE 50 MCG: 50 INJECTION, SOLUTION INTRAMUSCULAR; INTRAVENOUS at 09:08

## 2017-08-02 RX ADMIN — PHENYLEPHRINE HYDROCHLORIDE 100 MCG: 10 INJECTION INTRAVENOUS at 09:08

## 2017-08-02 RX ADMIN — EPHEDRINE SULFATE 10 MG: 50 INJECTION, SOLUTION INTRAMUSCULAR; INTRAVENOUS; SUBCUTANEOUS at 10:08

## 2017-08-02 RX ADMIN — METOPROLOL TARTRATE 25 MG: 25 TABLET ORAL at 03:08

## 2017-08-02 RX ADMIN — PROMETHAZINE HYDROCHLORIDE 6.25 MG: 25 INJECTION INTRAMUSCULAR; INTRAVENOUS at 11:08

## 2017-08-02 RX ADMIN — ESMOLOL HYDROCHLORIDE 20 MG: 10 INJECTION INTRAVENOUS at 10:08

## 2017-08-02 RX ADMIN — EPHEDRINE SULFATE 15 MG: 50 INJECTION, SOLUTION INTRAMUSCULAR; INTRAVENOUS; SUBCUTANEOUS at 10:08

## 2017-08-02 RX ADMIN — HYDRALAZINE HYDROCHLORIDE 5 MG: 20 INJECTION INTRAMUSCULAR; INTRAVENOUS at 08:08

## 2017-08-02 RX ADMIN — GLYCOPYRROLATE 0.4 MG: 0.2 INJECTION, SOLUTION INTRAMUSCULAR; INTRAVENOUS at 09:08

## 2017-08-02 RX ADMIN — PHENYLEPHRINE HYDROCHLORIDE 50 MCG: 10 INJECTION INTRAVENOUS at 09:08

## 2017-08-02 RX ADMIN — ESMOLOL HYDROCHLORIDE 20 MG: 10 INJECTION INTRAVENOUS at 09:08

## 2017-08-02 RX ADMIN — HYDRALAZINE HYDROCHLORIDE 2.5 MG: 20 INJECTION INTRAMUSCULAR; INTRAVENOUS at 08:08

## 2017-08-02 RX ADMIN — CLONIDINE HYDROCHLORIDE 0.1 MG: 0.1 TABLET ORAL at 09:08

## 2017-08-02 RX ADMIN — ONDANSETRON 4 MG: 2 INJECTION INTRAMUSCULAR; INTRAVENOUS at 12:08

## 2017-08-02 RX ADMIN — NEOSTIGMINE METHYLSULFATE 4 MG: 1 INJECTION INTRAVENOUS at 09:08

## 2017-08-02 RX ADMIN — ACETAMINOPHEN 650 MG: 325 TABLET ORAL at 02:08

## 2017-08-02 RX ADMIN — SODIUM CHLORIDE: 0.9 INJECTION, SOLUTION INTRAVENOUS at 07:08

## 2017-08-02 RX ADMIN — PHENYLEPHRINE HYDROCHLORIDE 300 MCG: 10 INJECTION INTRAVENOUS at 10:08

## 2017-08-02 RX ADMIN — FENTANYL CITRATE 25 MCG: 50 INJECTION, SOLUTION INTRAMUSCULAR; INTRAVENOUS at 07:08

## 2017-08-02 RX ADMIN — LIDOCAINE HYDROCHLORIDE 10 MG: 10 INJECTION, SOLUTION EPIDURAL; INFILTRATION; INTRACAUDAL; PERINEURAL at 07:08

## 2017-08-02 RX ADMIN — APIXABAN 2.5 MG: 2.5 TABLET, FILM COATED ORAL at 09:08

## 2017-08-02 RX ADMIN — MIDAZOLAM HYDROCHLORIDE 1 MG: 1 INJECTION, SOLUTION INTRAMUSCULAR; INTRAVENOUS at 08:08

## 2017-08-02 RX ADMIN — PROTAMINE SULFATE 50 MG: 10 INJECTION, SOLUTION INTRAVENOUS at 09:08

## 2017-08-02 RX ADMIN — METOPROLOL TARTRATE 25 MG: 25 TABLET ORAL at 09:08

## 2017-08-02 RX ADMIN — CISATRACURIUM BESYLATE 14 MG: 10 INJECTION INTRAVENOUS at 08:08

## 2017-08-02 RX ADMIN — ESMOLOL HYDROCHLORIDE 30 MG: 10 INJECTION INTRAVENOUS at 10:08

## 2017-08-02 RX ADMIN — VALSARTAN 80 MG: 80 TABLET ORAL at 09:08

## 2017-08-02 RX ADMIN — Medication 2 G: at 08:08

## 2017-08-02 RX ADMIN — ETOMIDATE 12 MG: 2 INJECTION, SOLUTION INTRAVENOUS at 08:08

## 2017-08-02 RX ADMIN — FENTANYL CITRATE 25 MCG: 50 INJECTION, SOLUTION INTRAMUSCULAR; INTRAVENOUS at 08:08

## 2017-08-02 RX ADMIN — HEPARIN SODIUM 5000 UNITS: 1000 INJECTION, SOLUTION INTRAVENOUS; SUBCUTANEOUS at 09:08

## 2017-08-02 RX ADMIN — SODIUM CHLORIDE: 0.9 INJECTION, SOLUTION INTRAVENOUS at 09:08

## 2017-08-02 RX ADMIN — PHENYLEPHRINE HYDROCHLORIDE 200 MCG: 10 INJECTION INTRAVENOUS at 10:08

## 2017-08-02 RX ADMIN — ETOMIDATE 4 MG: 2 INJECTION, SOLUTION INTRAVENOUS at 09:08

## 2017-08-02 NOTE — PLAN OF CARE
Problem: Patient Care Overview  Goal: Plan of Care Review  Outcome: Ongoing (interventions implemented as appropriate)  Pt arrived from PACU; oriented to room; family at bedside; will continue to monitor.

## 2017-08-02 NOTE — PROGRESS NOTES
"2D echo called at [phone] to come see patient per MD order. Sadaf states "we are going to do that tomorrow when she is in her room". I asked sadaf to please verify with MD for approval to wait. Tech agrees. Will continue to monitor patient in stable condition  "

## 2017-08-02 NOTE — ANESTHESIA RELEASE NOTE
"Anesthesia Release from PACU Note    Patient: Jerel Howard    Procedure(s) Performed: Procedure(s) (LRB):  GRAFT-ARTERIOVENOUS left femoral leg- Revision and replacement (Left)    Anesthesia type: general    Post pain: Adequate analgesia    Post assessment: no apparent anesthetic complications, tolerated procedure well and no evidence of recall    Last Vitals:   Visit Vitals  BP (!) 187/78   Pulse 82   Temp 36.2 °C (97.2 °F) (Temporal)   Resp (!) 31   Ht 5' 2" (1.575 m)   Wt 54.9 kg (121 lb)   SpO2 100%   Breastfeeding? No   BMI 22.13 kg/m²       Post vital signs: stable, AFib, rate controlled    Level of consciousness: awake, alert  and oriented    Nausea/Vomiting: no nausea/no vomiting    Complications: unexpected post-op hospitalization and Patient went into AFib RVR intraoperatively, patient has history of AFib and is on eliquis but no documentation of AFib in chart or pre-op notes    Airway Patency: patent    Respiratory: unassisted    Cardiovascular: stable and blood pressure at baseline    Hydration: euvolemic  "

## 2017-08-02 NOTE — CONSULTS
"[facility]  Cardiology Consult Note    Attending Physician: Natalie Doevr MD  Reason for Consult: tachycardia    HPI:   Ms. Howard is a 76 y.o. Lady with hypertension, hyperlipidemia peripheral arterial disease, severe AS s/p TAVR, pulmonary hypertension PASP 90s, ESRD on HD T,Th,Sa and Atrial fibrillation who is consulted to cardiology for tachycardia intra-operatively.  She came in for an outpatient procedure today - left femoral AVG placement due to issues with other grafts.  The actual surgery was uncomplicated however towards the end of surgery it was noted that she became increasingly tachycardia and hypertensive.  She required multiple doses of hydralazine for blood pressure, systolics in the 200s, and then also became tachycardic in the 150s.  An EKG was obtained which revealed atrial fibrillation, anesthesia was unaware of the diagnosis of atrial fibrillation prior to the procedure however on discussion with the family this was likely a prior diagnosis.  She follows with Dr. Palafox at  in cardiology who this past October noticed a "fast rhythm" that put her at risk for stroke.  He recommended at blood thinner, which she did not take until she had a mini stroke in march 2017, after which she has been on eliquis.  The only rate controlling agent she is on at baseline is clonidine 0.1 BID. She required a brief course of esmolol intra-operatively however currently has rates in 70s without any treatment.  She reports no chest pain, no palpitations, mild shortness of breath, otherwise only symptom is pain at surgical site.      Review of Systems   Review of Systems   Constitution: Positive for weakness. Negative for diaphoresis, fever, malaise/fatigue and night sweats.   HENT: Negative.    Eyes: Negative.    Cardiovascular: Negative for chest pain, claudication, dyspnea on exertion, irregular heartbeat, leg swelling, orthopnea and palpitations.   Respiratory: Positive for shortness of breath.  "   Endocrine: Negative.    Skin: Negative.    Musculoskeletal: Negative.    Gastrointestinal: Negative.    Genitourinary: Negative.      PMH:     Past Medical History:   Diagnosis Date    Atrial fibrillation 08/02/2017    previous CVA, on eliqu    Dialysis care     Hemodialysis patient     Tues, Thurs, Sat    Hypertension     Renal disorder     Seizures     Stroke     Thyroid disease      Past Surgical History:   Procedure Laterality Date    AV FISTULA PLACEMENT      CORNEAL TRANSPLANT      B eyes    HYSTERECTOMY      UTERINE FIBROID SURGERY          Allergies:     Review of patient's allergies indicates:  No Known Allergies     Medications:     No current facility-administered medications on file prior to encounter.      Current Outpatient Prescriptions on File Prior to Encounter   Medication Sig Dispense Refill    cloNIDine (CATAPRES) 0.2 MG tablet Take 0.5 tablets (0.1 mg total) by mouth 2 (two) times daily. 30 tablet 11    FOLIC ACID/VITAMIN B COMP W-C (TRIPHROCAPS ORAL) Take 1 capsule by mouth.      lidocaine-prilocaine (EMLA) cream       sevelamer carbonate (RENVELA) 800 mg Tab Take 1 tablet (800 mg total) by mouth 3 (three) times daily with meals. 90 tablet 11    valsartan (DIOVAN) 80 MG tablet TK 1 T PO  BID  6    amlodipine (NORVASC) 10 MG tablet 10 mg once daily.       amlodipine (NORVASC) 5 MG tablet   1    apixaban (ELIQUIS) 2.5 mg Tab Take 5 mg by mouth 2 (two) times daily.      cinacalcet (SENSIPAR) 60 MG Tab Take 60 mg by mouth.      furosemide (LASIX) 40 MG tablet Take 1 tablet (40 mg total) by mouth 2 (two) times daily. (Patient taking differently: Take 80 mg by mouth once daily. 4x  weekly) 60 tablet 11    [DISCONTINUED] aspirin 81 MG Chew Take 1 tablet (81 mg total) by mouth once daily.  0        Social History:     Social History   Substance Use Topics    Smoking status: Never Smoker    Smokeless tobacco: Never Used    Alcohol use No        Family History:     Family  History   Problem Relation Age of Onset    Cancer Sister     Hypertension Brother         Physical Exam:     Vitals:  Temp:  [97.2 °F (36.2 °C)-98.1 °F (36.7 °C)]   Pulse:  []   Resp:  [13-34]   BP: (129-187)/(55-87)   SpO2:  [100 %]   I/O's:    Intake/Output Summary (Last 24 hours) at 08/02/17 1538  Last data filed at 08/02/17 1036   Gross per 24 hour   Intake              550 ml   Output                0 ml   Net              550 ml        Physical Exam   Constitutional: She is oriented to person, place, and time. She appears well-developed and well-nourished. No distress.   HENT:   Head: Normocephalic and atraumatic.   Mouth/Throat: No oropharyngeal exudate.   Eyes: EOM are normal. No scleral icterus.   Neck: Normal range of motion. Neck supple. No JVD present. No tracheal deviation present. No thyromegaly present.   Cardiovascular: Normal rate.  Exam reveals no gallop and no friction rub.    Murmur (soft systolic murmur) heard.  Prominent S2, irregularly irregular   Pulmonary/Chest: Effort normal and breath sounds normal. No respiratory distress. She has no wheezes. She has no rales.   Anteriorly, unable to sit up due to surgical site   Abdominal: Soft. She exhibits no distension. There is no tenderness. There is no rebound.   Musculoskeletal: Normal range of motion. She exhibits no edema.   Neurological: She is alert and oriented to person, place, and time.   Skin: Skin is warm and dry. She is not diaphoretic. No erythema.   Pain at left groin surgical site   Psychiatric: She has a normal mood and affect.     Labs:     Recent Results (from the past 336 hour(s))   CBC auto differential    Collection Time: 08/02/17 11:09 AM   Result Value Ref Range    WBC 9.58 3.90 - 12.70 K/uL    Hemoglobin 11.8 (L) 12.0 - 16.0 g/dL    Hematocrit 35.2 (L) 37.0 - 48.5 %    Platelets 182 150 - 350 K/uL       Recent Results (from the past 336 hour(s))   Basic metabolic panel    Collection Time: 08/02/17  7:49 AM   Result  Value Ref Range    Sodium 138 136 - 145 mmol/L    Potassium 5.2 (H) 3.5 - 5.1 mmol/L    Chloride 98 95 - 110 mmol/L    CO2 24 23 - 29 mmol/L    BUN, Bld 28 (H) 8 - 23 mg/dL    Creatinine 5.9 (H) 0.5 - 1.4 mg/dL    Calcium 9.5 8.7 - 10.5 mg/dL    Anion Gap 16 8 - 16 mmol/L       Lab Results   Component Value Date    CHOL 182 03/25/2009    HDL 37 (L) 03/25/2009    LDLCALC 114.6 03/25/2009    TRIG 152 (H) 03/25/2009         Recent Labs  Lab 08/02/17  1356   TROPONINI 0.074*     Estimated Creatinine Clearance: 6.6 mL/min (based on Cr of 5.9).    Imaging:  Slight pulmonary edema vs. Old fibrosis.    Echo:  8/2017:  General: The patient was in an irregularly irregular rhythm throughout the study.   Aorta: The aortic root is normal in size, measuring 2.2 cm at sinotubular junction and 2.2 cm at Sinuses of Valsalva. The proximal ascending aorta is normal in size, measuring 3.0 cm across.   Left Atrium: The left atrial volume index is severely enlarged, measuring 56.62 cc/m2.   Left Ventricle: The left ventricle is upper limit of normal, with an end-diastolic diameter of 5.1 cm, and an end-systolic diameter of 3.1 cm. LV wall thickness is normal, with the septum measuring 0.7 cm and the posterior wall measuring 1.0 cm across.   Relative wall thickness was normal at 0.39, and the LV mass index was increased at 111.0 g/m2 consistent with eccentric left ventricular hypertrophy. Global left ventricular systolic function appears normal. Visually estimated ejection fraction is   65-70%. The LV Doppler derived stroke volume equals 83.0 ccs.   There is normal systolic/diastolic flow in the pulmonary vein indicating normal left atrial pressures. The E/e'(lat) is 32, consistent with significant diastolic dysfunction.   Right Atrium: The right atrium is mildly enlarged, measuring 5.1 cm in length and 3.9 cm in width in the apical view.   Right Ventricle: The right ventricle is normal in size measuring 3.2 cm at the base in the apical  right ventricle-focused view. RV free wall thickness is increased measuring 0.5 cm, consistent with RVH. Global right ventricular systolic function appears   normal. Tricuspid annular plane systolic excursion (TAPSE) is 1.7 cm. Tissue Doppler-derived tricuspid annular peak systolic velocity (S prime) is 10.9 cm/s. The estimated PA systolic pressure is 89 mmHg.   Aortic Valve:  There is a percutaneously replaced bioprosthesis in the aortic position. The aortic valve prosthesis is well seated. The peak velocity obtained across the aortic valve is 1.7 m/s, which translates to a peak gradient of 12.0 mmHg. The mean   gradient is 6.0 mmHg. Using a left ventricular outflow tract diameter of 1.9 cm, a left ventricular outflow tract velocity time integral of 30 cm, and a peak instantaneous transvalvular velocity time integral of 44 cm, the effective prosthetic valve area   is 1.89 cm2. The effective prosthetic valve area corrected for BSA is 1.2 cm2. Additionally, there is trivial aortic regurgitation.   Mitral Valve:  The mitral valve is moderately sclerotic. There is mild mitral regurgitation. There is mitral annular calcification.   Tricuspid Valve:  The tricuspid valve is normal in structure with normal leaflet mobility. There is mild tricuspid regurgitation.   Pulmonary Valve:  The pulmonic valve is normal in structure with mikayla leaflet mobility. There is mild pulmonic regurgitation.   Pericardium: There is no evidence of pericardial effusion.    IVC: IVC is enlarged but collapses > 50% with a sniff, suggesting intermediate right atrial pressure of 8 mmHg.   Intracavitary: There is no evidence of intracavity mass, thrombi, or vegetation.   CONCLUSIONS     1 - Upper limit of normal left ventricular enlargement.     2 - Normal left ventricular systolic function (EF 65-70%).     3 - Eccentric hypertrophy.     4 - Left ventricular diastolic dysfunction.     5 - Biatrial enlargement.     6 - Right ventricular hypertrophy.      7 - Normal right ventricular systolic function .     8 - S/p transcatheter AVR, effective prosthetic valve area corrected for BSA is 1.2 cm2.     9 - Trivial aortic regurgitation.     10 - Mild mitral regurgitation.     11 - Mild tricuspid regurgitation.     12 - Mild pulmonic regurgitation.     13 - Intermediate central venous pressure.     14 - Pulmonary hypertension. The estimated PA systolic pressure is 89 mmHg.     EKG:   AF    Telemetry:   AF    Assessment & Recommendations:     Ms. Howard is a 76 y.o. Lady with hypertension, hyperlipidemia peripheral arterial disease, severe AS s/p TAVR, pulmonary hypertension PASP 90s, ESRD on HD T,Th,Sa and Atrial fibrillation who is consulted to cardiology for tachycardia intra-operatively.    #Tachycardia - Ms. Howard likely had prior diagnosis of atrial fibrillation on the basis of eliquis as her home medication prescribed by cardiologist for fast rhythm that put her at risk for stroke.  Currently her rate is controlled, suspect that tachycardia is related to surgery and now she is within normal limits.  -Would restart eliquis at lower dose (2.5 BID) given weight < 60kg once felt safe from surgery perspective  -No need for additional rate controlling agents at this time, defer to primary cardiologist.  -Will sign off, please re-consult with questions.    Thank you for this consult. Further recommendations are pending the attending addendum. Please do not hesitate to page with questions.     Signed:  Danny Haynes M.D.  Cardiology Fellow  Pager: [phone]  8/2/2017 3:38 PM    Attending Addendum:

## 2017-08-02 NOTE — H&P (VIEW-ONLY)
9457:  Patient has not shown up for surgery. Phoned her & she stated her paper work said 0900 & she could not be here before that. Gatito Shaw in [place] made aware. 8437:  Dr. Rakel Mcneil made aware.   Will call in next case Patient ID: Jerel Howard is a 76 y.o. female.    I. HISTORY     Chief Complaint: ESRD f/u    HPI Jerel Howard 76 y.o.  with ESRD on dialysis s/p PTA Right femoral graft (see details in procedures below) on 7/19/17.  Pt. Is Malaysian speaking only and here with her daughter who interprets for her.  Of note, the office was called while patient was in the vascular lab about 5 minutes ago with report that an area began squirting blood during the vascular ultrasound.  Pressure was applied in the lab.  Patient now presents to the clinic holding pressure to the left Leg AVG.  Pt. Reports that the area that is bleeding is around the area that was accessed for dialysis yesterday. Pt. Reports that there have been no issues during dialysis or with prolonged bleeding since the last intervention on 7/19/17.  She dialyzes T, H, S.     1.  Angioplasty, right femoral AV graft (7 x 40 [place]), outflow femoral vein (9 x 40 [place]), 7/19/17  2.   Drug-eluting angioplasty, left femoral AV graft, 09/28/2016.  3.  Prior stent placement of left iliac artery, angioplasty of left SFA and left   femoral venous anastomotic stenosis, all 03/10/2014.  4.  Original left femoral AV graft placement, 03/12/2008.  5.  Recent left lower extremity arteriogram and tibioperoneal stent placement,   July 2016, after a TAVR (Dr. Dover).       Review of Systems   Constitution: Negative for chills, decreased appetite, fever, weakness, weight gain and weight loss.   HENT: Negative for headaches.    Eyes: Negative for blurred vision.   Cardiovascular: Negative for chest pain, claudication, leg swelling and syncope.   Respiratory: Negative for cough and shortness of breath.    Hematologic/Lymphatic: Negative for bleeding problem. Does not bruise/bleed easily.   Skin: Negative for rash.   Musculoskeletal: Negative for joint pain, muscle cramps and muscle weakness.   Gastrointestinal: Negative for abdominal pain, change in bowel habit, diarrhea, nausea and vomiting.    Neurological: Negative for numbness and paresthesias.   Psychiatric/Behavioral: Negative for altered mental status.       Current Outpatient Prescriptions on File Prior to Visit   Medication Sig Dispense Refill    amlodipine (NORVASC) 10 MG tablet 10 mg once daily.       amlodipine (NORVASC) 5 MG tablet   1    apixaban (ELIQUIS) 2.5 mg Tab Take 5 mg by mouth 2 (two) times daily.      aspirin 81 MG Chew Take 1 tablet (81 mg total) by mouth once daily.  0    cinacalcet (SENSIPAR) 60 MG Tab Take 60 mg by mouth.      cloNIDine (CATAPRES) 0.2 MG tablet Take 0.5 tablets (0.1 mg total) by mouth 2 (two) times daily. 30 tablet 11    FOLIC ACID/VITAMIN B COMP W-C (TRIPHROCAPS ORAL) Take 1 capsule by mouth.      furosemide (LASIX) 40 MG tablet Take 1 tablet (40 mg total) by mouth 2 (two) times daily. (Patient taking differently: Take 80 mg by mouth once daily. 4x  weekly) 60 tablet 11    lidocaine-prilocaine (EMLA) cream       sevelamer carbonate (RENVELA) 800 mg Tab Take 1 tablet (800 mg total) by mouth 3 (three) times daily with meals. 90 tablet 11    valsartan (DIOVAN) 80 MG tablet TK 1 T PO  BID  6     No current facility-administered medications on file prior to visit.        Past Medical History:   Diagnosis Date    Dialysis care     Hypertension     Renal disorder     Seizures     Stroke     Thyroid disease        Family History   Problem Relation Age of Onset    Cancer Sister     Hypertension Brother      Social History     Social History    Marital status: Single     Spouse name: N/A    Number of children: N/A    Years of education: N/A     Occupational History    Not on file.     Social History Main Topics    Smoking status: Never Smoker    Smokeless tobacco: Never Used    Alcohol use No    Drug use: No    Sexual activity: Not on file     Other Topics Concern    Not on file     Social History Narrative    No narrative on file       Review of patient's allergies indicates:  No Known  Allergies    Family History   Problem Relation Age of Onset    Cancer Sister     Hypertension Brother        II. PHYSICAL EXAM     Physical Exam   Constitutional: She is oriented to person, place, and time. She appears well-developed and well-nourished. No distress.   HENT:   Head: Normocephalic and atraumatic.   Eyes: Conjunctivae, EOM and lids are normal.   Neck: Neck supple.   Cardiovascular: Intact distal pulses.    Pulmonary/Chest: Effort normal. No stridor. No respiratory distress. She has no wheezes.   Abdominal: Soft. She exhibits no distension. There is no tenderness.   Musculoskeletal: Normal range of motion.        Left upper leg: She exhibits no tenderness and no swelling.   Left Leg AVG with good thrill, minimal pulsatility.   Area along medial area with 4mm area with ulceration noted, not actively bleeding at this time.     Neurological: She is alert and oriented to person, place, and time. She exhibits normal muscle tone. Coordination normal.   Skin: Skin is warm and dry. No rash noted. No erythema.   Psychiatric: She has a normal mood and affect.   Vitals reviewed.     III. ASSESSMENT & PLAN (MEDICAL DECISION MAKING)     Imaging Results:    AVG duplex shows decreased flow volume of 1.8 L  With no evidence of hemodynamically significant stenosis.     1. Complications due to renal dialysis device, implant, and graft, initial encounter  Case Request Operating Room: GRAFT-ARTERIOVENOUS left leg- Revision and replacement    Place in Outpatient    Height and weight    Verify surgical site    Verify informed consent    If patient is going home post procedure, verify arrangements are made for a     Void on call to OR    Insert and maintain IV access    Notify Physician/Vital Signs Parameters    Vital signs   2. Complication of vascular access for dialysis, initial encounter           Assessment/Diagnosis and Plan: Bleeding Ageing Left leg AVG     PLAN:  Stitch placed to area of ulceration on left leg  AVG; pt. Instructed to return ER if there are any issues with bleeding  L femoral AVG replacement and revision scheduled for 8/2/17  Hold Eliquis 3 days prior to scheduled procedure    MARYLOU Castaneda, APRN, FNP-BC  Nurse Practitioner  Vascular and Endovascular Surgery'    VASCULAR STAFF    I have personally taken the history and examined this patient and agree with the resident's note as stated above    Will plan on a body revision of the L femoral AVG with an Accuseal      I have explained the risks, benefits and alternatives for this procedure in detail with the aid of her family member as .   The patients voices understanding and all questions have be answered, and agrees to proceed with the procedure.    CORA Espinosa III, MD, FACS  Professor and Chief, Vascular and Endovascular Surgery

## 2017-08-02 NOTE — TRANSFER OF CARE
"Anesthesia Transfer of Care Note    Patient: Jerel Howard    Procedure(s) Performed: Procedure(s) (LRB):  GRAFT-ARTERIOVENOUS left femoral leg- Revision and replacement (Left)    Patient location: PACU    Anesthesia Type: general    Transport from OR: Transported from OR on 100% O2 by closed face mask with adequate spontaneous ventilation    Post pain: adequate analgesia    Post vital signs: stable    Level of consciousness: awake    Nausea/Vomiting: no nausea/vomiting    Complications: other (see comments), pt with rapid hr, ekg done, pt  in AFIB, reported to Pacu anesthesia resident  and Cardiac consult ordered    Transfer of care protocol was followedComments: Pt stable EKG shows AFib cardiac consult ordered, report to nurse      Last vitals:   Visit Vitals  BP (!) 147/55 (BP Location: Right arm, Patient Position: Lying, BP Method: Automatic)   Pulse 68   Temp 36.7 °C (98.1 °F) (Oral)   Resp 16   Ht 5' 2" (1.575 m)   Wt 54.9 kg (121 lb)   SpO2 100%   Breastfeeding? No   BMI 22.13 kg/m²     "

## 2017-08-02 NOTE — NURSING TRANSFER
Nursing Transfer Note      8/2/2017     Transfer 940B    Transfer via STRETCHER    Transfer with dentures intact, cardiac monitoring    Transported by tech    Medicines sent: Eliquis    Chart send with patient: yes    Notified: family at bedside      Patient reassessed at: 8/2 @ 8771

## 2017-08-02 NOTE — H&P
"History & Physical  Hospital Medicine      SUBJECTIVE:     Chief Complaint/Reason for Admission:   New onset Afib    History of Present Illness:  Ms Jerel Howard is a 76 y.o. [place] lady with HTN, HLD, PAD, ESRD on HD, h/o severe AS s/p TAVR 7/2016 (LEONIDES Dover), severe PHTN, h/o stroke, reported seizure disorder, anticoagulated on eliquis (?indication - prior h/o Afib per family, with stroke deemed to be embolic), who presented to [facility] today for elective revision of her L femoral AVG with Dr Espinosa,surgery went ok but she had labile BP throughout and at the end of the case she developed rapid HR 140s-150s with subsequent hypotension, deemed to be new onset Afib with RVR with hemodynamic instability, EKG done and confirmed Afib, admitting to [facility] with cardiology consult.      I discussed case with both Chelsea Rutledge MD (anesthesia staff) and Lacy Triplett MD (anesthesia resident)  She was doing okay in pre-op, then at the beginning of the case she developed malignant HTN with SBP 230s so was given a total of 12.5mg Iv of hydralazine (2.5, 2.5, 2.5, then 5), and near the end of the case she developed HR 140s-150s with no evident p waves and ST depressions on the telemetry monitor, so she was given esmolol IV 70mg total (20, 20, then 30) which helped bring HR down, but she developed hypotension and was given ephedrine (45 total) and elfego (800 total).  In the PACU, HR 110s, EKG done and confirmed Afib with RVR, and she was perfusing well. I discussed with PACU nurse around 130pm, and her SBP had been ranging 140s-150s with hR 80s-90s.  Most recent set of VS was 187/78 and 82. She c/o feeling "bad all over" with an uncomfortable feeling in her chest     Of note, the onset of Afib with RVR preceded the administration of vasopressors  Her home med, Eliquis, has been on hold for the past 3 days per pre-op protocol.     She does not drink EtOH and has never smoked.    Currently feeling well except for a mild headache. No " CP/palp    Review of Systems:  Constitutional: no fever or chills  Eyes: no visual changes  ENT: no nasal congestion or sore throat  Respiratory: no cough or shortness of breath  Cardiovascular: no chest pain or palpitations  Gastrointestinal: no nausea or vomiting, no abdominal pain or change in bowel habits  Genitourinary: no hematuria or dysuria  Integument/Breast: no rash or pruritis  Hematologic/Lymphatic: no easy bruising or lymphadenopathy  Allergy/Immunology: none  Musculoskeletal: no arthralgias or myalgias  Neurological: no seizures or tremors  Behavioral/Psych: no auditory or visual hallucinations  Endocrine: no heat or cold intolerance    HISTORY:     Past Medical History:   Diagnosis Date    Dialysis care     Hemodialysis patient     Tues, Thurs, Sat    Hypertension     Renal disorder     Seizures     Stroke     Thyroid disease        Past Surgical History:   Procedure Laterality Date    AV FISTULA PLACEMENT      CORNEAL TRANSPLANT      B eyes    HYSTERECTOMY      UTERINE FIBROID SURGERY         Family History   Problem Relation Age of Onset    Cancer Sister     Hypertension Brother        Social History     Social History    Marital status: Single     Spouse name: N/A    Number of children: N/A    Years of education: N/A     Social History Main Topics    Smoking status: Never Smoker    Smokeless tobacco: Never Used    Alcohol use No    Drug use: No    Sexual activity: Not Asked     Other Topics Concern    None     Social History Narrative    None       MEDICATIONS & ALLERGIES:     Current Facility-Administered Medications   Medication    0.9%  NaCl infusion    acetaminophen tablet 650 mg    hydrocodone-acetaminophen 5-325mg per tablet 1 tablet    ondansetron 4 mg/2 mL injection    promethazine (PHENERGAN) 6.25 mg in dextrose 5 % 50 mL IVPB    sodium chloride 0.9% flush 3 mL       Review of patient's allergies indicates:  No Known Allergies    OBJECTIVE:     Vital  Signs:  Temp:  [97.2 °F (36.2 °C)-98.1 °F (36.7 °C)] 97.2 °F (36.2 °C)  Pulse:  [] 82  Resp:  [13-31] 31  SpO2:  [100 %] 100 %  BP: (129-187)/(55-87) 187/78    Physical Exam:  General: well developed, well nourished, no distress  Eyes: conjunctivae/corneas clear. PERRL..  HENT: Head:normocephalic, atraumatic. Ears:bilateral TM's and external ear canals normal. Nose: Nares normal. Septum midline. Mucosa normal. No drainage or sinus tenderness., no discharge. Throat: lips, mucosa, and tongue normal; teeth and gums normal and no throat erythema.  Neck: supple, symmetrical, trachea midline, no JVD and thyroid not enlarged, symmetric, no tenderness/mass/nodules  Lungs:  clear to auscultation bilaterally and normal respiratory effort  Cardiovascular: Heart: regular rate and rhythm, S1, S2 normal, no murmur, click, rub or gallop. Chest Wall: no tenderness. Extremities: no cyanosis or edema, or clubbing. Pulses: 2+ and symmetric.  Abdomen/Rectal: Abdomen: soft, non-tender non-distented; bowel sounds normal; no masses,  no organomegaly. Rectal: not examined  Skin: Skin color, texture, turgor normal. No rashes or lesions  Musculoskeletal:normal gait and no clubbing, cyanosis  Lymph Nodes: No cervical or supraclavicular adenopathy  Neurologic: Normal strength and tone. No focal numbness or weakness  Psych/Behavioral:  Alert and oriented, appropriate affect., Speech: appropriate quality, quantity and organization of sentences and Thought content: normal    Laboratory  Recent Results (from the past 24 hour(s))   Basic metabolic panel    Collection Time: 08/02/17  7:49 AM   Result Value Ref Range    Sodium 138 136 - 145 mmol/L    Potassium 5.2 (H) 3.5 - 5.1 mmol/L    Chloride 98 95 - 110 mmol/L    CO2 24 23 - 29 mmol/L    Glucose 140 (H) 70 - 110 mg/dL    BUN, Bld 28 (H) 8 - 23 mg/dL    Creatinine 5.9 (H) 0.5 - 1.4 mg/dL    Calcium 9.5 8.7 - 10.5 mg/dL    Anion Gap 16 8 - 16 mmol/L    eGFR if African American 7.5 (A) >60  mL/min/1.73 m^2    eGFR if non African American 6.5 (A) >60 mL/min/1.73 m^2   Magnesium    Collection Time: 08/02/17  7:49 AM   Result Value Ref Range    Magnesium 2.6 1.6 - 2.6 mg/dL   CBC auto differential    Collection Time: 08/02/17 11:09 AM   Result Value Ref Range    WBC 9.58 3.90 - 12.70 K/uL    RBC 3.44 (L) 4.00 - 5.40 M/uL    Hemoglobin 11.8 (L) 12.0 - 16.0 g/dL    Hematocrit 35.2 (L) 37.0 - 48.5 %     (H) 82 - 98 fL    MCH 34.3 (H) 27.0 - 31.0 pg    MCHC 33.5 32.0 - 36.0 g/dL    RDW 12.6 11.5 - 14.5 %    Platelets 182 150 - 350 K/uL    MPV 10.8 9.2 - 12.9 fL    Gran # 7.8 (H) 1.8 - 7.7 K/uL    Lymph # 1.4 1.0 - 4.8 K/uL    Mono # 0.3 0.3 - 1.0 K/uL    Eos # 0.0 0.0 - 0.5 K/uL    Baso # 0.02 0.00 - 0.20 K/uL    Gran% 81.6 (H) 38.0 - 73.0 %    Lymph% 14.3 (L) 18.0 - 48.0 %    Mono% 3.1 (L) 4.0 - 15.0 %    Eosinophil% 0.3 0.0 - 8.0 %    Basophil% 0.2 0.0 - 1.9 %    Differential Method Automated        Diagnostic Results:  EKG -     CXR portable - prominence of pulm vasculature and RLL opacity     2D Echo with CFD - pending    ASSESSMENT & PLAN:     Afib, paroxysmal vs chronic - with RVR intra-operatively, symptomatic with fatigue and abnormal sensation of chest and ST depressions on telemetry  - start metoprolol 25 PO BID  - resume home apixaban but at 2.5 BID rather than 5 BID (for body weight, per cardiology).  Ok with vascular surgery- 1st given at 2pm (had been on hold for past 3 days prior to surgery).   - check TSH, trop, BNP, D dimer (low suspicion for PE, will check D Dimer for good negative predictive value)  - f/c cardiology recs (discussed with Dr Danny Haynes who will see her)  - keep K+ >4 and Mg >2    Abnormal CXR - possible pulm edema  - due for fluid removal via HD tomorrow     ESRD on HD - last HD 8/1 - s/p elective graft surgery today   - ok to use revised graft per vascular surgery  - nephrology consult for HD  - home cinacalcet 60 daily and renvela 800 TID  - needs HD tomorrow  prior to d/c home (as she is usually dialyzed at 4am on TThSat)    h/o severe AS s/p TAVR 7/2016 (LEONIDES Dover)    severe PHTN (90 mmHg)    Borderline leukocytosis - WBC 9.58 with 81% gran - likely from stress from major vascular surgery  - check UA (if she makes urine)  - will follow    Renovascular HTN  - home valsartan 80 PO BID  - home amlodipine 10  - home clonidine 0.1 BID     h/o stroke  - ?not on ASA or statin  - start atorva 40    PAD  - ?not on ASA or statin  - start atorva 40    Aortic atheroma  - start atorva    reported seizure disorder - not on any meds    Anemia of ESRD - stable - monitor    GERD - home PPI    PseudohyperK - K+5.2 but slightly hemolyzed, K+ on iSTAT intra-op was wnl per anesthesia resident Dr Triplett    Prophylaxis- already on home eliquis    Advance directives- full code    Post-acute care- pending clinical condition, likely home tomorrow morning AFTER HD. F/u with primary cardiologist at [facility]    Natalie Dover MD  Hospital Medicine Staff

## 2017-08-02 NOTE — PLAN OF CARE
Plan of care reviewed with patient and daughter in law. Pt offered , pt refused and requested daughter in law to  for her. All question asked and translated to patient per daughter in law. Pt denies pain at this time. Pt awaiting to go to surgery. Call light within reach. Will cont to monitor.

## 2017-08-02 NOTE — ANESTHESIA PREPROCEDURE EVALUATION
Pre-operative evaluation for Procedure(s):  REPLACEMENT-VALVE-AORTIC    Jerel Howard is a 76 y.o. female with pmhx significant for severe AS s/p avr, pulm HTN, PAD, ESRD on HD (T, TH, S), last HD yesterday.     Patient Active Problem List   Diagnosis    Hypertensive crisis    Malfunction of arteriovenous dialysis fistula    End stage renal disease    Aortic stenosis    PAD (peripheral artery disease)    Aortic valve stenosis    Severe aortic stenosis    Nodular calcific aortic valve stenosis    Encounter for management of temporary pacemaker    Complications due to renal dialysis device, implant, and graft    Stenosis of arteriovenous dialysis fistula    AV graft malfunction    AVF (arteriovenous fistula)    Dependence on hemodialysis    Complication of vascular access for dialysis       No Known Allergies     No current facility-administered medications on file prior to encounter.      Current Outpatient Prescriptions on File Prior to Encounter   Medication Sig Dispense Refill    cloNIDine (CATAPRES) 0.2 MG tablet Take 0.5 tablets (0.1 mg total) by mouth 2 (two) times daily. 30 tablet 11    FOLIC ACID/VITAMIN B COMP W-C (TRIPHROCAPS ORAL) Take 1 capsule by mouth.      lidocaine-prilocaine (EMLA) cream       sevelamer carbonate (RENVELA) 800 mg Tab Take 1 tablet (800 mg total) by mouth 3 (three) times daily with meals. 90 tablet 11    valsartan (DIOVAN) 80 MG tablet TK 1 T PO  BID  6    amlodipine (NORVASC) 10 MG tablet 10 mg once daily.       amlodipine (NORVASC) 5 MG tablet   1    apixaban (ELIQUIS) 2.5 mg Tab Take 5 mg by mouth 2 (two) times daily.      cinacalcet (SENSIPAR) 60 MG Tab Take 60 mg by mouth.      furosemide (LASIX) 40 MG tablet Take 1 tablet (40 mg total) by mouth 2 (two) times daily. (Patient taking differently: Take 80 mg by mouth once daily. 4x  weekly) 60 tablet 11       Past Surgical History:   Procedure Laterality Date    AV FISTULA PLACEMENT      CORNEAL TRANSPLANT       B eyes    HYSTERECTOMY      UTERINE FIBROID SURGERY         Social History     Social History    Marital status: Single     Spouse name: N/A    Number of children: N/A    Years of education: N/A     Occupational History    Not on file.     Social History Main Topics    Smoking status: Never Smoker    Smokeless tobacco: Never Used    Alcohol use No    Drug use: No    Sexual activity: Not on file     Other Topics Concern    Not on file     Social History Narrative    No narrative on file               Diagnostic Studies:    8/16 2D ECHO w CFD    CONCLUSIONS     1 - Upper limit of normal left ventricular enlargement.     2 - Normal left ventricular systolic function (EF 65-70%).     3 - Eccentric hypertrophy.     4 - Left ventricular diastolic dysfunction.     5 - Biatrial enlargement.     6 - Right ventricular hypertrophy.     7 - Normal right ventricular systolic function .     8 - S/p transcatheter AVR, effective prosthetic valve area corrected for BSA is 1.2 cm2.     9 - Trivial aortic regurgitation.     10 - Mild mitral regurgitation.     11 - Mild tricuspid regurgitation.     12 - Mild pulmonic regurgitation.     13 - Intermediate central venous pressure.     14 - Pulmonary hypertension. The estimated PA systolic pressure is 89 mmHg.         Pre-op Assessment    I have reviewed the Patient Summary Reports.     I have reviewed the Nursing Notes.      Review of Systems  Anesthesia Hx:  No problems with previous Anesthesia  History of prior surgery of interest to airway management or planning: Denies Family Hx of Anesthesia complications.   Denies Personal Hx of Anesthesia complications.   Social:  Non-Smoker    Cardiovascular:   Hypertension Valvular problems/Murmurs PVD AMAYA NYHA Classification III ECG has been reviewed. H/o TAVR 2016, PAH PAP 89   Pulmonary:   Shortness of breath    Renal/:   Chronic Renal Disease, ESRD, Dialysis    Hepatic/GI:  Hepatic/GI Normal    Neurological:   CVA Seizures     Endocrine:  Endocrine Normal        Physical Exam  General:  Well nourished    Airway/Jaw/Neck:  Airway Findings: Mouth Opening: Normal Tongue: Normal  General Airway Assessment: Adult  Mallampati: III  Improves to II with phonation.  TM Distance: Normal, at least 6 cm  Jaw/Neck Findings:  Neck ROM: Normal ROM     Eyes/Ears/Nose:  Eyes/Ears/Nose Findings:    Dental:  Dental Findings: Upper Dentures, Lower Dentures   Chest/Lungs:  Chest/Lungs Findings: Clear to auscultation, Normal Respiratory Rate     Heart/Vascular:  Heart Findings: Rate: Normal  Rhythm: Regular Rhythm  Sounds: Normal  Heart Murmur  Systolic  Systolic Heart Murmur Description: L Upper Sternal Border, Holosystolic, Ejection Type, Radiates to Carotids  Systolic Heart Murmur Grade: Grade III  Vascular Findings:  Dialysis Access: Left Forearm Graft, Right Forearm Graft  Vascular Exam Findings: Left femoral AV fistula        Mental Status:  Mental Status Findings:  Cooperative, Alert and Oriented         Anesthesia Plan  Type of Anesthesia, risks & benefits discussed:  Anesthesia Type:  general  Patient's Preference: MAC  Intra-op Monitoring Plan: arterial line and standard ASA monitors  Intra-op Monitoring Plan Comments:   Post Op Pain Control Plan:   Post Op Pain Control Plan Comments:   Induction:   IV  Beta Blocker:  Patient is not currently on a Beta-Blocker (No further documentation required).       Informed Consent: Patient understands risks and agrees with Anesthesia plan.  Questions answered. Anesthesia consent signed with patient.  ASA Score: 4     Day of Surgery Review of History & Physical:    H&P update referred to the provider.         Ready For Surgery From Anesthesia Perspective.

## 2017-08-02 NOTE — INTERVAL H&P NOTE
The patient has been examined and the H&P has been reviewed:    I concur with the findings and no changes have occurred since H&P was written.    Anesthesia/Surgery risks, benefits and alternative options discussed and understood by patient/family.          Active Hospital Problems    Diagnosis  POA    Complication of vascular access for dialysis [T82.9XXA]  Yes      Resolved Hospital Problems    Diagnosis Date Resolved POA   No resolved problems to display.

## 2017-08-02 NOTE — PROGRESS NOTES
"Dr Dover called regarding eliquis order. Will await call back and continue to monitor patient in stable condition.     [id number] Unable to reach Dr Dover. Dr Haynes with cardiology paged and states "Hold off for now. I will run it by Dr Dover and call you back". Will continue to monitor patient in stable condition.   "

## 2017-08-02 NOTE — ANESTHESIA POSTPROCEDURE EVALUATION
"Anesthesia Post Evaluation    Patient: Jerel Howard    Procedure(s) Performed: Procedure(s) (LRB):  GRAFT-ARTERIOVENOUS left femoral leg- Revision and replacement (Left)    Final Anesthesia Type: general  Patient location during evaluation: PACU  Patient participation: Yes- Able to Participate  Level of consciousness: awake and alert and oriented  Post-procedure vital signs: reviewed and stable  Pain management: adequate  Airway patency: patent  PONV status at discharge: No PONV  Anesthetic complications: yes  Perioperative Events: arrhythmias requiring treatment and unplanned hospital admission  Vidhi-operative Events Comments: Patient went into AFib RVR intraoperatively, appeared to be in sinus rhythm pre-op. Patient has history of AFib and is on eliquis but no documentation of AFib in chart or pre-op notes. Admission to Obs for monitoring.   Cardiovascular status: blood pressure returned to baseline  Respiratory status: unassisted  Hydration status: euvolemic  Follow-up not needed.        Visit Vitals  BP (!) 187/78   Pulse 82   Temp 36.2 °C (97.2 °F) (Temporal)   Resp (!) 31   Ht 5' 2" (1.575 m)   Wt 54.9 kg (121 lb)   SpO2 100%   Breastfeeding? No   BMI 22.13 kg/m²       Pain/Zurdo Score: Pain Assessment Performed: Yes (8/2/2017 11:00 AM)  Presence of Pain: denies (8/2/2017 11:00 AM)  Pain Rating Prior to Med Admin: 4 (8/2/2017  2:29 PM)  Zurdo Score: 10 (8/2/2017 11:00 AM)      "

## 2017-08-02 NOTE — PROGRESS NOTES
Family and doctor at bedside to give patients family updates. Will continue to monitor patient in stable condition

## 2017-08-03 VITALS
OXYGEN SATURATION: 96 % | HEIGHT: 62 IN | SYSTOLIC BLOOD PRESSURE: 134 MMHG | DIASTOLIC BLOOD PRESSURE: 63 MMHG | TEMPERATURE: 98 F | BODY MASS INDEX: 22.26 KG/M2 | HEART RATE: 66 BPM | RESPIRATION RATE: 18 BRPM | WEIGHT: 121 LBS

## 2017-08-03 PROBLEM — E78.5 HYPERLIPIDEMIA: Status: ACTIVE | Noted: 2017-08-03

## 2017-08-03 PROBLEM — N18.9 CHRONIC KIDNEY DISEASE-MINERAL AND BONE DISORDER: Chronic | Status: ACTIVE | Noted: 2017-08-03

## 2017-08-03 PROBLEM — J96.01 ACUTE HYPOXEMIC RESPIRATORY FAILURE: Status: ACTIVE | Noted: 2017-08-03

## 2017-08-03 PROBLEM — Z99.2 ANEMIA IN CHRONIC KIDNEY DISEASE, ON CHRONIC DIALYSIS: Chronic | Status: ACTIVE | Noted: 2017-08-03

## 2017-08-03 PROBLEM — I27.20 MODERATE TO SEVERE PULMONARY HYPERTENSION: Status: ACTIVE | Noted: 2017-08-03

## 2017-08-03 PROBLEM — D63.1 ANEMIA IN CHRONIC KIDNEY DISEASE, ON CHRONIC DIALYSIS: Chronic | Status: ACTIVE | Noted: 2017-08-03

## 2017-08-03 PROBLEM — M89.9 CHRONIC KIDNEY DISEASE-MINERAL AND BONE DISORDER: Chronic | Status: ACTIVE | Noted: 2017-08-03

## 2017-08-03 PROBLEM — I50.33 ACUTE ON CHRONIC DIASTOLIC CHF (CONGESTIVE HEART FAILURE): Status: ACTIVE | Noted: 2017-08-03

## 2017-08-03 PROBLEM — Z86.73 HISTORY OF STROKE: Status: ACTIVE | Noted: 2017-08-03

## 2017-08-03 PROBLEM — R94.31 ST SEGMENT DEPRESSION: Status: ACTIVE | Noted: 2017-08-03

## 2017-08-03 PROBLEM — E03.8 SUBCLINICAL HYPOTHYROIDISM: Status: ACTIVE | Noted: 2017-08-03

## 2017-08-03 PROBLEM — Z99.2 ESRD ON HEMODIALYSIS: Chronic | Status: ACTIVE | Noted: 2017-08-03

## 2017-08-03 PROBLEM — I70.90 ATHEROMA OF ARTERY: Status: ACTIVE | Noted: 2017-08-03

## 2017-08-03 PROBLEM — N18.6 ANEMIA IN CHRONIC KIDNEY DISEASE, ON CHRONIC DIALYSIS: Chronic | Status: ACTIVE | Noted: 2017-08-03

## 2017-08-03 PROBLEM — N18.6 ESRD ON HEMODIALYSIS: Chronic | Status: ACTIVE | Noted: 2017-08-03

## 2017-08-03 PROBLEM — E83.9 CHRONIC KIDNEY DISEASE-MINERAL AND BONE DISORDER: Chronic | Status: ACTIVE | Noted: 2017-08-03

## 2017-08-03 PROBLEM — J96.01 ACUTE RESPIRATORY FAILURE WITH HYPOXEMIA: Status: ACTIVE | Noted: 2017-08-03

## 2017-08-03 LAB
ALBUMIN SERPL BCP-MCNC: 3.2 G/DL
ALP SERPL-CCNC: 93 U/L
ALT SERPL W/O P-5'-P-CCNC: <5 U/L
ANION GAP SERPL CALC-SCNC: 13 MMOL/L
AORTIC VALVE REGURGITATION: ABNORMAL
AST SERPL-CCNC: 12 U/L
BASOPHILS # BLD AUTO: 0.01 K/UL
BASOPHILS NFR BLD: 0.1 %
BILIRUB SERPL-MCNC: 0.4 MG/DL
BUN SERPL-MCNC: 40 MG/DL
CALCIUM SERPL-MCNC: 9.7 MG/DL
CHLORIDE SERPL-SCNC: 98 MMOL/L
CO2 SERPL-SCNC: 27 MMOL/L
CREAT SERPL-MCNC: 7.6 MG/DL
DIFFERENTIAL METHOD: ABNORMAL
EOSINOPHIL # BLD AUTO: 0 K/UL
EOSINOPHIL NFR BLD: 0.5 %
ERYTHROCYTE [DISTWIDTH] IN BLOOD BY AUTOMATED COUNT: 12.9 %
EST. GFR  (AFRICAN AMERICAN): 5.5 ML/MIN/1.73 M^2
EST. GFR  (NON AFRICAN AMERICAN): 4.8 ML/MIN/1.73 M^2
ESTIMATED PA SYSTOLIC PRESSURE: 110.65
GLOBAL PERICARDIAL EFFUSION: ABNORMAL
GLUCOSE SERPL-MCNC: 82 MG/DL
HCT VFR BLD AUTO: 30.7 %
HGB BLD-MCNC: 9.9 G/DL
LYMPHOCYTES # BLD AUTO: 0.7 K/UL
LYMPHOCYTES NFR BLD: 8.5 %
MAGNESIUM SERPL-MCNC: 2.3 MG/DL
MCH RBC QN AUTO: 33.9 PG
MCHC RBC AUTO-ENTMCNC: 32.2 G/DL
MCV RBC AUTO: 105 FL
MONOCYTES # BLD AUTO: 0.6 K/UL
MONOCYTES NFR BLD: 8 %
NEUTROPHILS # BLD AUTO: 6.4 K/UL
NEUTROPHILS NFR BLD: 82.8 %
PHOSPHATE SERPL-MCNC: 7.5 MG/DL
PLATELET # BLD AUTO: 169 K/UL
PMV BLD AUTO: 11.8 FL
POTASSIUM SERPL-SCNC: 5.4 MMOL/L
PROT SERPL-MCNC: 6.6 G/DL
RBC # BLD AUTO: 2.92 M/UL
RETIRED EF AND QEF - SEE NOTES: 65 (ref 55–65)
SODIUM SERPL-SCNC: 138 MMOL/L
TRICUSPID VALVE REGURGITATION: ABNORMAL
WBC # BLD AUTO: 7.72 K/UL

## 2017-08-03 PROCEDURE — 25000003 PHARM REV CODE 250: Performed by: NURSE PRACTITIONER

## 2017-08-03 PROCEDURE — 85025 COMPLETE CBC W/AUTO DIFF WBC: CPT

## 2017-08-03 PROCEDURE — 93306 TTE W/DOPPLER COMPLETE: CPT

## 2017-08-03 PROCEDURE — 80100016 HC MAINTENANCE HEMODIALYSIS

## 2017-08-03 PROCEDURE — 25000003 PHARM REV CODE 250: Performed by: STUDENT IN AN ORGANIZED HEALTH CARE EDUCATION/TRAINING PROGRAM

## 2017-08-03 PROCEDURE — 93306 TTE W/DOPPLER COMPLETE: CPT | Mod: 26,,, | Performed by: INTERNAL MEDICINE

## 2017-08-03 PROCEDURE — 80053 COMPREHEN METABOLIC PANEL: CPT

## 2017-08-03 PROCEDURE — 25000003 PHARM REV CODE 250: Performed by: INTERNAL MEDICINE

## 2017-08-03 PROCEDURE — 99239 HOSP IP/OBS DSCHRG MGMT >30: CPT | Mod: ,,, | Performed by: HOSPITALIST

## 2017-08-03 PROCEDURE — 83735 ASSAY OF MAGNESIUM: CPT

## 2017-08-03 PROCEDURE — 25000003 PHARM REV CODE 250: Performed by: HOSPITALIST

## 2017-08-03 PROCEDURE — 99223 1ST HOSP IP/OBS HIGH 75: CPT | Mod: ,,, | Performed by: INTERNAL MEDICINE

## 2017-08-03 PROCEDURE — 36415 COLL VENOUS BLD VENIPUNCTURE: CPT

## 2017-08-03 PROCEDURE — 11000001 HC ACUTE MED/SURG PRIVATE ROOM

## 2017-08-03 PROCEDURE — 84100 ASSAY OF PHOSPHORUS: CPT

## 2017-08-03 RX ORDER — METOPROLOL TARTRATE 25 MG/1
25 TABLET, FILM COATED ORAL 2 TIMES DAILY
Qty: 60 TABLET | Refills: 0 | Status: SHIPPED | OUTPATIENT
Start: 2017-08-03 | End: 2017-11-06

## 2017-08-03 RX ORDER — ATORVASTATIN CALCIUM 40 MG/1
40 TABLET, FILM COATED ORAL NIGHTLY
Qty: 30 TABLET | Refills: 0 | Status: SHIPPED | OUTPATIENT
Start: 2017-08-03 | End: 2017-12-20

## 2017-08-03 RX ORDER — SODIUM CHLORIDE 9 MG/ML
INJECTION, SOLUTION INTRAVENOUS
Status: DISCONTINUED | OUTPATIENT
Start: 2017-08-03 | End: 2017-08-03 | Stop reason: HOSPADM

## 2017-08-03 RX ORDER — SEVELAMER CARBONATE 800 MG/1
1600 TABLET, FILM COATED ORAL
Status: DISCONTINUED | OUTPATIENT
Start: 2017-08-03 | End: 2017-08-03 | Stop reason: HOSPADM

## 2017-08-03 RX ORDER — SODIUM CHLORIDE 9 MG/ML
INJECTION, SOLUTION INTRAVENOUS ONCE
Status: COMPLETED | OUTPATIENT
Start: 2017-08-03 | End: 2017-08-03

## 2017-08-03 RX ADMIN — VALSARTAN 80 MG: 80 TABLET ORAL at 03:08

## 2017-08-03 RX ADMIN — SODIUM CHLORIDE 300 ML: 0.9 INJECTION, SOLUTION INTRAVENOUS at 09:08

## 2017-08-03 RX ADMIN — METOPROLOL TARTRATE 25 MG: 25 TABLET ORAL at 03:08

## 2017-08-03 RX ADMIN — AMLODIPINE BESYLATE 10 MG: 10 TABLET ORAL at 03:08

## 2017-08-03 RX ADMIN — PANTOPRAZOLE SODIUM 40 MG: 40 TABLET, DELAYED RELEASE ORAL at 03:08

## 2017-08-03 RX ADMIN — APIXABAN 2.5 MG: 2.5 TABLET, FILM COATED ORAL at 03:08

## 2017-08-03 RX ADMIN — SEVELAMER CARBONATE 1600 MG: 800 TABLET, FILM COATED ORAL at 03:08

## 2017-08-03 RX ADMIN — HYDROCODONE BITARTRATE AND ACETAMINOPHEN 1 TABLET: 5; 325 TABLET ORAL at 09:08

## 2017-08-03 NOTE — CONSULTS
[facility]  Nephrology  Consult Note    Patient Name: Jerel Howard  MRN: [id number]  Admission Date: 8/2/2017  Hospital Length of Stay: 1 days  Attending Provider: Natalie Dover MD   Primary Care Physician: Rudi Barker MD  Principal Problem:Atrial fibrillation with rapid ventricular response    Consults  Subjective:     HPI: Mr. Jerel Howard is a 75 yo female with significant history hypertension, hyperlipidemia, PAD, CVA x 3 (at [facility]), atrial fibrillation ([facility] Card Dr. Palafox), and ESRD on HD x 14 years who is admitted to hospital for atrial fibrillation with RVR and hypotension after outpatient revision of left femoral AVG. Cardiology following. Nephology consult for HD. Patient receives HD TTS 3 hrs duration at [facility]. Very minimal residual and does not IDW. CXR showed pulmonary edema.     Past Medical History:   Diagnosis Date    Acute on chronic diastolic CHF (congestive heart failure) 8/3/2017    Atrial fibrillation 08/02/2017    previous CVA, on eliqu    Dialysis care     Hemodialysis patient     [facility], Thurs, Sat    Hyperlipidemia 8/3/2017    Hypertension     Renal disorder     Seizures     Stroke     Thyroid disease        Past Surgical History:   Procedure Laterality Date    AV FISTULA PLACEMENT      CORNEAL TRANSPLANT      B eyes    HYSTERECTOMY      UTERINE FIBROID SURGERY         Review of patient's allergies indicates:  No Known Allergies  Current Facility-Administered Medications   Medication Frequency    0.9%  NaCl infusion Continuous    0.9%  NaCl infusion PRN    0.9%  NaCl infusion Once    amlodipine tablet 10 mg Daily    apixaban tablet 2.5 mg BID    atorvastatin tablet 40 mg QHS    cinacalcet tablet 60 mg Daily with breakfast    cloNIDine tablet 0.1 mg BID    hydrocodone-acetaminophen 5-325mg per tablet 1 tablet Q4H PRN    metoprolol tartrate (LOPRESSOR) tablet 25 mg BID    pantoprazole EC tablet 40 mg Daily    sevelamer carbonate tablet 800 mg TID  WM    sodium chloride 0.9% flush 3 mL PRN    valsartan tablet 80 mg BID     Family History     Problem Relation (Age of Onset)    Cancer Sister    Hypertension Brother        Social History Main Topics    Smoking status: Never Smoker    Smokeless tobacco: Never Used    Alcohol use No    Drug use: No    Sexual activity: Not on file     Review of Systems   Constitutional: Positive for activity change and fatigue. Negative for appetite change.   Respiratory: Negative for chest tightness and shortness of breath.    Cardiovascular: Negative for chest pain and leg swelling.     Objective:     Vital Signs (Most Recent):  Temp: 97.9 °F (36.6 °C) (08/03/17 0800)  Pulse: 68 (08/03/17 0800)  Resp: 18 (08/03/17 0800)  BP: (!) 163/70 (08/03/17 0800)  SpO2: (!) 89 % (08/03/17 0800)  O2 Device (Oxygen Therapy): room air (08/03/17 0800) Vital Signs (24h Range):  Temp:  [96.4 °F (35.8 °C)-99.3 °F (37.4 °C)] 97.9 °F (36.6 °C)  Pulse:  [] 68  Resp:  [13-34] 18  SpO2:  [89 %-100 %] 89 %  BP: (119-187)/(58-87) 163/70     Weight: 54.9 kg (121 lb) (08/02/17 0654)  Body mass index is 22.13 kg/m².  Body surface area is 1.55 meters squared.    I/O last 3 completed shifts:  In: 550 [I.V.:550]  Out: -     Physical Exam   Constitutional: She appears well-developed.   Thin and frail appearing   Cardiovascular: Normal rate and regular rhythm.    No extremities edema.   Pulmonary/Chest: Effort normal.   Mild inspiratory crackles at bases other wise clear.        Significant Labs:  All labs within the past 24 hours have been reviewed.    Significant Imaging:  Labs: Reviewed    Assessment/Plan:     Active Diagnoses:    Diagnosis Date Noted POA    PRINCIPAL PROBLEM:  Atrial fibrillation with rapid ventricular response [I48.91] 08/02/2017 Yes    Acute on chronic diastolic CHF (congestive heart failure) [I50.33] 08/03/2017 Yes    Acute hypoxemic respiratory failure [J96.01] 08/03/2017 Yes    Subclinical hypothyroidism [E03.9]  08/03/2017 Yes    Atheroma of artery [I70.90] 08/03/2017 Yes    History of stroke [Z86.73] 08/03/2017 Not Applicable    Hyperlipidemia [E78.5] 08/03/2017 Yes    Moderate to severe pulmonary hypertension [I27.2] 08/03/2017 Yes    ESRD on hemodialysis [N18.6, Z99.2] 08/03/2017 Not Applicable     Chronic    Anemia in chronic kidney disease, on chronic dialysis [N18.6, D63.1, Z99.2] 08/03/2017 Not Applicable     Chronic    Chronic kidney disease-mineral and bone disorder [N18.9, E83.9, M89.9] 08/03/2017 Unknown     Chronic    Complication of vascular access for dialysis [T82.9XXA] 08/02/2017 Yes    Atrial fibrillation [I48.91] 08/02/2017 Yes    New onset atrial fibrillation [I48.91] 08/02/2017 Yes    Atrial fibrillation with RVR [I48.91] 08/02/2017 Yes    Aortic stenosis [I35.0] 06/13/2016 Yes     Chronic    PAD (peripheral artery disease) [I73.9] 06/13/2016 Yes     Chronic      Problems Resolved During this Admission:    Diagnosis Date Noted Date Resolved POA     ESRD on HD TTS  Electrolytes and acid/base reviewed, High K, Cca,Phos noted. BP stable.  Appreciate Vascular surgery following. Okay to use Left fem AVF with 17 g and low -250  Dialysate adjusted to current labs. UF 2-2.5 L. Obtain outpatient\ HD flow sheet.      Anemia of CKD  Hgb 9.9. Continue to trend and start CARLOS if need.     Bone Mineral Disease of CKD  -Renal diet when no longer NPO. Increase Renvela with meals. Continue Sensipar. Avoid any Ca based supplements or Ca containing binders.   Obtain PTH.       Thank you for your consult. I will follow-up with patient. Please contact us if you have any additional questions.    Diann Escamilla, BRISEIDA  Nephrology  [facility]

## 2017-08-03 NOTE — PROGRESS NOTES
3 HR maintenance HD treatment initiated via Lt Femoral AVG  with gauge 17'' needles and BFR of 250 mls/min. Good flows obtained.

## 2017-08-03 NOTE — SUBJECTIVE & OBJECTIVE
Medications:  Continuous Infusions:   sodium chloride 0.9%       Scheduled Meds:   amlodipine  10 mg Oral Daily    apixaban  2.5 mg Oral BID    atorvastatin  40 mg Oral QHS    cinacalcet  60 mg Oral Daily with breakfast    cloNIDine  0.1 mg Oral BID    metoprolol tartrate  25 mg Oral BID    pantoprazole  40 mg Oral Daily    sevelamer carbonate  800 mg Oral TID WM    valsartan  80 mg Oral BID     PRN Meds:hydrocodone-acetaminophen 5-325mg, sodium chloride 0.9%     Objective:     Vital Signs (Most Recent):  Temp: 98.5 °F (36.9 °C) (08/03/17 0403)  Pulse: 62 (08/03/17 0403)  Resp: 17 (08/02/17 1956)  BP: (!) 119/58 (08/03/17 0403)  SpO2: 96 % (08/03/17 0403) Vital Signs (24h Range):  Temp:  [96.4 °F (35.8 °C)-99.3 °F (37.4 °C)] 98.5 °F (36.9 °C)  Pulse:  [] 62  Resp:  [13-34] 17  SpO2:  [94 %-100 %] 96 %  BP: (119-187)/(58-87) 119/58          Physical Exam   Constitutional: She appears well-developed and well-nourished.   HENT:   Head: Normocephalic and atraumatic.   Cardiovascular: Normal rate, regular rhythm and normal heart sounds.    Pulmonary/Chest: Effort normal and breath sounds normal.   L fem AVG with thrill    Significant Labs:  CBC:   Recent Labs  Lab 08/02/17  1109   WBC 9.58   RBC 3.44*   HGB 11.8*   HCT 35.2*      *   MCH 34.3*   MCHC 33.5     CMP:   Recent Labs  Lab 08/02/17  0749   *   CALCIUM 9.5      K 5.2*   CO2 24   CL 98   BUN 28*   CREATININE 5.9*       Significant Diagnostics:  I have reviewed all pertinent imaging results/findings within the past 24 hours.

## 2017-08-03 NOTE — ASSESSMENT & PLAN NOTE
77 yo admitted for bleeding left femoral AVG s/p revision and accuseal     -POD1 doing well   -Dialysis today  -Can D/c after dialysis from vascular standpoint  -F/u with Robbie in 2 weeks with U/S

## 2017-08-03 NOTE — PLAN OF CARE
Problem: Patient Care Overview  Goal: Plan of Care Review  Outcome: Ongoing (interventions implemented as appropriate)  AAOx4. VSS. No c/o of HA, SB, LOC, Chest pain, diaphoresis, n/v. Remained NPO after Midnight. Q1 hourly rounding. Safety precautions maintained. Pt and family questions and concerns addressed. No acute events. Will continue to monitor and reassess.     Problem: Fall Risk (Adult)  Goal: Identify Related Risk Factors and Signs and Symptoms  Related risk factors and signs and symptoms are identified upon initiation of Human Response Clinical Practice Guideline (CPG)   Outcome: Ongoing (interventions implemented as appropriate)  Remained free of falls/ injury/ trauma. Call bell in reach, bed in lowest positions and wheels locked. Pt and family verbalizes understanding of calling for assistance before exiting bed.

## 2017-08-03 NOTE — PROGRESS NOTES
[facility]  Vascular Surgery  Progress Note    Patient Name: Jerel Howard  MRN: [id number]  Admission Date: 8/2/2017  Primary Care Provider: Rudi Barker MD    Subjective:     Interval History: No issues overnight    Post-Op Info:  Procedure(s) (LRB):  GRAFT-ARTERIOVENOUS left femoral leg- Revision and replacement (Left)   1 Day Post-Op       Medications:  Continuous Infusions:   sodium chloride 0.9%       Scheduled Meds:   amlodipine  10 mg Oral Daily    apixaban  2.5 mg Oral BID    atorvastatin  40 mg Oral QHS    cinacalcet  60 mg Oral Daily with breakfast    cloNIDine  0.1 mg Oral BID    metoprolol tartrate  25 mg Oral BID    pantoprazole  40 mg Oral Daily    sevelamer carbonate  800 mg Oral TID WM    valsartan  80 mg Oral BID     PRN Meds:hydrocodone-acetaminophen 5-325mg, sodium chloride 0.9%     Objective:     Vital Signs (Most Recent):  Temp: 98.5 °F (36.9 °C) (08/03/17 0403)  Pulse: 62 (08/03/17 0403)  Resp: 17 (08/02/17 1956)  BP: (!) 119/58 (08/03/17 0403)  SpO2: 96 % (08/03/17 0403) Vital Signs (24h Range):  Temp:  [96.4 °F (35.8 °C)-99.3 °F (37.4 °C)] 98.5 °F (36.9 °C)  Pulse:  [] 62  Resp:  [13-34] 17  SpO2:  [94 %-100 %] 96 %  BP: (119-187)/(58-87) 119/58          Physical Exam   Constitutional: She appears well-developed and well-nourished.   HENT:   Head: Normocephalic and atraumatic.   Cardiovascular: Normal rate, regular rhythm and normal heart sounds.    Pulmonary/Chest: Effort normal and breath sounds normal.   L fem AVG with thrill    Significant Labs:  CBC:   Recent Labs  Lab 08/02/17  1109   WBC 9.58   RBC 3.44*   HGB 11.8*   HCT 35.2*      *   MCH 34.3*   MCHC 33.5     CMP:   Recent Labs  Lab 08/02/17  0749   *   CALCIUM 9.5      K 5.2*   CO2 24   CL 98   BUN 28*   CREATININE 5.9*       Significant Diagnostics:  I have reviewed all pertinent imaging results/findings within the past 24 hours.    Assessment/Plan:     Complication  of vascular access for dialysis    77 yo admitted for bleeding left femoral AVG s/p revision and accuseal     -POD1 doing well   -Dialysis today  -Can D/c after dialysis from vascular standpoint  -F/u with Robbie in 2 weeks with U/S        AVG should be accessed under sterile conditions for 2 weeks, with 17 gauge needles, 200-250 ml/min flow for the entire session and pressure held for 10-15 minutes after accessing.     Igor Ross MD  Vascular Surgery  [facility]

## 2017-08-03 NOTE — PLAN OF CARE
Problem: Patient Care Overview  Goal: Plan of Care Review  Outcome: Ongoing (interventions implemented as appropriate)  3 hr hd completed, net fluid [id number], target goal achieved, pt tolerated well. Report given to River DIAMOND.

## 2017-08-03 NOTE — PROGRESS NOTES
No further episodes of RVR overnight, continues to remain in AF rate controlled.  Cont Metoprolol 25 BID on discharge.  Eliquis dosing decreased to 2.5 given weight and renal function, recommending this on discharge.  Ok to discharge from a cardiology perspective, follow up with Dr. Palafox for cardiology at .  Please notify cardiology service if any ongoing issues.    Danny Haynes MD.

## 2017-08-03 NOTE — PROGRESS NOTES
Progress Note  [facility] Medicine - [facility]      Admit Date: 8/2/2017    SUBJECTIVE:     Follow-up For:  Atrial fibrillation with rapid ventricular response    HPI and Hospital Course: see my H&P    Interval History: Pulm edema on CXR and O2 sat 89 on RA, pending HD today. HR well-controlled in the 60s on metoprolol 25 BID. Asymptomatic.    Review of Systems:  Pain Scale: 0 /10   Constitutional: no fever or chills  Respiratory: no cough or shortness of breath  Cardiovascular: no chest pain or palpitations  Gastrointestinal: no nausea or vomiting, no abdominal pain or change in bowel habits  Genitourinary: no hematuria or dysuria  Integument/Breast: no rash or pruritis  Hematologic/Lymphatic: no easy bruising or lymphadenopathy  Musculoskeletal: no arthralgias or myalgias  Neurological: no seizures or tremors  Behavioral/Psych: no depression or anxiety    OBJECTIVE:     Vital Signs Range (Last 24H):  Temp:  [96.4 °F (35.8 °C)-99.3 °F (37.4 °C)]   Pulse:  []   Resp:  [13-34]   BP: (119-187)/(58-87)   SpO2:  [89 %-100 %]     I & O (Last 24H):  Intake/Output Summary (Last 24 hours) at 08/03/17 0909  Last data filed at 08/02/17 1036   Gross per 24 hour   Intake              550 ml   Output                0 ml   Net              550 ml       Physical Exam:  General appearance: no distress  Mental status: Alert and oriented x 3  HEENT:  conjunctivae/corneas clear, PERRL  Neck: supple, thyroid not enlarged  Pulm:   normal respiratory effort, CTA B, no c/w/r  Card: RRR, S1, S2 normal, no murmur, click, rub or gallop  Abd: soft, NT, ND, BS present; no masses, no organomegaly  Ext: no c/c/e  Pulses: 2+, symmetric  Skin: color, texture, turgor normal. No rashes or lesions  Neuro: CN II-XII grossly intact, no focal numbness or weakness, normal strength and tone     Diagnostic Results:  Labs reviewed    Recent Results (from the past 24 hour(s))   ISTAT PROCEDURE    Collection Time: 08/02/17 10:31 AM   Result Value Ref Range     POC PH 7.325 (L) 7.35 - 7.45    POC PCO2 57.5 (H) 35 - 45 mmHg    POC PO2 60 40 - 60 mmHg    POC HCO3 29.9 (H) 24 - 28 mmol/L    POC BE 4 -2 to 2 mmol/L    POC SATURATED O2 88 (L) 95 - 100 %    POC Glucose 148 (H) 70 - 110 mg/dL    POC Sodium 138 136 - 145 mmol/L    POC Potassium 5.3 (H) 3.5 - 5.1 mmol/L    POC TCO2 32 (H) 24 - 29 mmol/L    POC Ionized Calcium 1.19 1.06 - 1.42 mmol/L    POC Hematocrit 40 36 - 54 %PCV    Sample VENOUS    CBC auto differential    Collection Time: 08/02/17 11:09 AM   Result Value Ref Range    WBC 9.58 3.90 - 12.70 K/uL    RBC 3.44 (L) 4.00 - 5.40 M/uL    Hemoglobin 11.8 (L) 12.0 - 16.0 g/dL    Hematocrit 35.2 (L) 37.0 - 48.5 %     (H) 82 - 98 fL    MCH 34.3 (H) 27.0 - 31.0 pg    MCHC 33.5 32.0 - 36.0 g/dL    RDW 12.6 11.5 - 14.5 %    Platelets 182 150 - 350 K/uL    MPV 10.8 9.2 - 12.9 fL    Gran # 7.8 (H) 1.8 - 7.7 K/uL    Lymph # 1.4 1.0 - 4.8 K/uL    Mono # 0.3 0.3 - 1.0 K/uL    Eos # 0.0 0.0 - 0.5 K/uL    Baso # 0.02 0.00 - 0.20 K/uL    Gran% 81.6 (H) 38.0 - 73.0 %    Lymph% 14.3 (L) 18.0 - 48.0 %    Mono% 3.1 (L) 4.0 - 15.0 %    Eosinophil% 0.3 0.0 - 8.0 %    Basophil% 0.2 0.0 - 1.9 %    Differential Method Automated    Troponin I    Collection Time: 08/02/17  1:56 PM   Result Value Ref Range    Troponin I 0.074 (H) 0.000 - 0.026 ng/mL   Brain natriuretic peptide    Collection Time: 08/02/17  1:56 PM   Result Value Ref Range    BNP 1,100 (H) 0 - 99 pg/mL   TSH    Collection Time: 08/02/17  1:56 PM   Result Value Ref Range    TSH 12.271 (H) 0.400 - 4.000 uIU/mL   T4, free    Collection Time: 08/02/17  1:56 PM   Result Value Ref Range    Free T4 1.17 0.71 - 1.51 ng/dL   D dimer, quantitative    Collection Time: 08/02/17  1:57 PM   Result Value Ref Range    D-Dimer 1.33 (H) <0.50 mg/L FEU   CBC auto differential    Collection Time: 08/03/17  7:42 AM   Result Value Ref Range    WBC 7.72 3.90 - 12.70 K/uL    RBC 2.92 (L) 4.00 - 5.40 M/uL    Hemoglobin 9.9 (L) 12.0 - 16.0 g/dL     Hematocrit 30.7 (L) 37.0 - 48.5 %     (H) 82 - 98 fL    MCH 33.9 (H) 27.0 - 31.0 pg    MCHC 32.2 32.0 - 36.0 g/dL    RDW 12.9 11.5 - 14.5 %    Platelets 169 150 - 350 K/uL    MPV 11.8 9.2 - 12.9 fL    Gran # 6.4 1.8 - 7.7 K/uL    Lymph # 0.7 (L) 1.0 - 4.8 K/uL    Mono # 0.6 0.3 - 1.0 K/uL    Eos # 0.0 0.0 - 0.5 K/uL    Baso # 0.01 0.00 - 0.20 K/uL    Gran% 82.8 (H) 38.0 - 73.0 %    Lymph% 8.5 (L) 18.0 - 48.0 %    Mono% 8.0 4.0 - 15.0 %    Eosinophil% 0.5 0.0 - 8.0 %    Basophil% 0.1 0.0 - 1.9 %    Differential Method Automated            ASSESSMENT/PLAN:     Afib, paroxysmal vs chronic - with RVR intra-operatively, symptomatic with fatigue and abnormal sensation of chest and ST depressions on telemetry  - started metoprolol 25 PO BID and HR 60s   - resumed home apixaban but at 2.5 BID rather than 5 BID (for body weight, per cardiology).  Ok with vascular surgery- 1st given at 2pm (had been on hold for past 3 days prior to surgery).   - check TSH, trop, BNP, D dimer (low suspicion for PE, will check D Dimer for good negative predictive value)  - f/u ECHO  - cardiology consulted  - keep K+ >4 and Mg >2    Subclinical hypothyroidism  TSH > 10 and FT4 wnl  - PCP to re-check TFTs when out of acute setting and start low dose sythroid if warranted, would not start at this time given the rapid Afib.      Acute on chronic diastolic CHF/PHTN with acute hypoxemic resp failure - pulm edema on CXR and O2 sat 89 on RA  - due for fluid removal via HD today  - monitor O2 sat on RA after HD     ESRD on HD - last HD 8/1 - s/p elective graft surgery today   - ok to use revised graft per vascular surgery  - nephrology consult for HD  - home cinacalcet 60 daily and renvela 800 TID  - needs HD today prior to d/c home (as she is usually dialyzed at 4am on TThSat)     h/o severe AS s/p TAVR 7/2016 (LEONIDES [place])     severe PHTN (90 mmHg)     Borderline leukocytosis - WBC 9.58 with 81% gran - likely from stress from major vascular  surgery  - check UA (if she makes urine)  - will follow     Renovascular HTN  - home valsartan 80 PO BID  - home amlodipine 10  - home clonidine 0.1 BID      h/o stroke  - ?not on ASA or statin  - started atorva 40     PAD  - ?not on ASA or statin  - started atorva 40     Aortic atheroma  - started atorva     reported seizure disorder - not on any meds     Anemia of ESRD - stable - monitor     GERD - home PPI     PseudohyperK - K+5.2 but slightly hemolyzed, K+ on iSTAT intra-op was wnl      Prophylaxis- already on home eliquis     Advance directives- full code     Post-acute care- pending clinical condition, improvement of O2 sat after HD, likely home today AFTER HD. F/u with primary cardiologist at [facility]    Time spent in care of patient: 40 mins    Natalie Dover MD  Hospital Medicine Staff

## 2017-08-04 NOTE — PROGRESS NOTES
AVS d/c instructions given to patient and daughter, voice understanding. PIV d/will with cath intact, gauze dressing applied. Called for hospital w/c transport.

## 2017-08-04 NOTE — OP NOTE
[facility]  Surgery Department  Operative Note    SUMMARY     Date of Procedure: 8/2/2017     Procedure: Procedure(s) (LRB):  GRAFT-ARTERIOVENOUS left femoral leg- Revision and replacement (Left)     Surgeon(s) and Role:     * Jak Diop MD - Resident - Assisting     * CEE Espinosa III, MD - Primary        Pre-Operative Diagnosis: Complications due to renal dialysis device, implant, and graft, initial encounter [T82.9XXA]    Post-Operative Diagnosis: Post-Op Diagnosis Codes:     * Complications due to renal dialysis device, implant, and graft, initial encounter [T82.9XXA]    Anesthesia: General    Technical Procedures Used: After informed consent, the pt was brought to the operating theater and placed supine on the table.  GETA was induced.  The left leg was prepped and draped in the usual sterile fashion.  A timeout was performed.  The left leg graft was easily identified by palpation in the upper medial thigh.  Using U/S, the prior stents within the graft were identified and marked.  The graft was noted to be highly calcified.  Two skin incisions were made over the proximal and distal portion of the graft distal to the stents.  The old graft was circumferentially freed using a combination of electrocautery and sharp dissection at the incision sites.  The patient was systemically heparinezed, control of the graft was obtained with vascular clamps proximally and distally, and the old graft was divided with heavy piper's.  An appropriate area for the new graft was chosen around the outside curve of the old graft.  A counter incision was made at the middle of the proposed new graft site and the graft as tunneled just under the dermis.  The 6 mm acuseal graft was cut to appropriate length to ensure that the graft laid flat without kinking or redundancy. The new graft was anastomosed to the cut ends of the old graft with a running 6-0 prolene.  Prior to tying down the new graft, the vascular  clamps were briefly removed to flush the new graft.  The anastomoses were complete, clamps were removed, and hemostasis was noted to be excellent.  Protamine was administered and hemostasis in the incision beds was achieved.  Incisions were closed with 3-0 nylon.  Dressings were applied.  At the close of the case, all instrument and sponge counts were correct.  Pt toleraetd the entire procedure without apparent complication    Description of the Findings of the Procedure: Division of old graft with placement of new 6mm acuseal graft.  Heavily calcified old graft    Significant Surgical Tasks Conducted by the Assistant(s), if Applicable: Assist    Complications: No    Estimated Blood Loss (EBL): * No values recorded between 8/2/2017  8:48 AM and 8/2/2017 10:11 AM *           Implants:   Implant Name Type Inv. Item Serial No.  Lot No. LRB No. Used   GRAFT ACUSEAL VASC [id number] - [id number]   GRAFT ACUSEAL VASC [id number] [employer]   Left 1       Specimens:   Specimen (12h ago through future)    None                  Condition: Good    Disposition: PACU - hemodynamically stable.    Attestation: I was present and scrubbed for the entire procedure.

## 2017-08-07 ENCOUNTER — PATIENT OUTREACH (OUTPATIENT)
Dept: ADMINISTRATIVE | Facility: CLINIC | Age: 77
End: 2017-08-07

## 2017-08-07 NOTE — PROGRESS NOTES
C3 nurse attempted to contact patient. No answer. The following message was left for the patient to return the call:  Good Morning, I am a nurse calling on behalf of [facility] from the [facility].  This is a Transitional Care Call for Jerel Howard . When you have a moment please contact us at [phone] or [phone] Monday through Friday, between the hours of 8 am to 4 pm. We look forward to speaking with you. On behalf of [facility] have a nice day.    The patient does not have a scheduled HOSFU appointment within 7-14 days post hospital discharge date 8/3/17. Message not sent to Physician staff to assist with HOSFU appointment scheduling.

## 2017-08-07 NOTE — PATIENT INSTRUCTIONS
Discharge Instructions for Atrial Fibrillation  You have been diagnosed with an abnormal heart rhythm called atrial fibrillation. With this condition, your hearts 2 upper chambers quiver rather than squeeze the blood out in a normal pattern. This leads to an irregular and sometimes rapid heartbeat. Some people will develop associated symptoms such as a flip-flopping heartbeat, chest pain, lightheadedness, or shortness of breath. Other people may have no symptoms at all. Atrial fibrillation is serious because it affects the hearts ability to fill with blood as it should. Blood clots may form. This increases the risk for stroke. Untreated atrial fibrillation can also lead to heart failure. Atrial fibrillation can be controlled. With treatment, most people with atrial fibrillation lead normal lives.  Treatment options  Recommended treatment for atrial fibrillation depends on your age, symptoms, how long you have had atrial fibrillation, and other factors. You will have a complete evaluation to find out if you have any abnormalities that caused your heart to go into atrial fibrillation. This might be blocked heart arteries or a thyroid problem. Your doctor will assess your particular case and discuss choices with you.  Treatment choices may include:  · Treating an underlying disorder that puts you at risk for atrial fibrillation. For example, correcting an abnormal thyroid or electrolyte problem, or treating a blocked heart artery.  · Restoring a normal heart rhythm with an electrical shock (cardioversion) or with an antiarrhythmic medicine (chemical cardioversion)  · Using medicine to control your heart rate in atrial fibrillation.  · Preventing the risk for blood clot and stroke using blood-thinning medicines. Your doctor will tell you what he or she recommends. Choices may include aspirin, clopidogrel, warfarin, dabigatran, rivaroxaban, apixaban, and edoxaban.  · Doing catheter ablation or a surgical maze  procedure. These use different methods to destroy certain areas of heart tissue. This interrupts the electrical signals causing atrial fibrillation. One of these procedures may be a choice when medicines do not work, or as an alternative to long-term medicine.  · Other treatment choices may be recommended for you by your doctor.  Managing risk factors for stroke and preventing heart failure are important parts of any treatment plan for atrial fibrillation.  Home care  · Take your medicines exactly as directed. Dont skip doses.  · Work with your doctor to find the right medicines and doses for you.  · Learn to take your own pulse. Keep a record of your results. Ask your doctor which pulse rates mean that you need medical attention. Slowing your pulse is often the goal of treatment. Ask your doctor if its OK for you to use an automatic machine to check your pulse at home. Sometimes these machines dont count the pulse correctly when you have atrial fibrillation.  · Limit your intake of coffee, tea, cola, and other beverages with caffeine. Talk with your doctor about whether you should eliminate caffeine.  · Avoid over-the-counter medicines that have caffeine in them.  · Let your doctor know what medicines you take, including prescription and over-the-counter medicines, as well as any supplements. They interfere with some medicines given for atrial fibrillation.  · Ask your doctor about whether you can drink alcohol. Some people need to avoid alcohol to better treat atrial fibrillation. If you are taking blood-thinner medicines, alcohol may interfere with them by increasing their effect.  · Never take stimulants such as amphetamines or cocaine. These drugs can speed up your heart rate and trigger atrial fibrillation.  Follow-up care  Follow up with your doctor, or as advised.     When should I call my healthcare provider  Call your healthcare provider right away if you have any of the  following:  · Weakness  · Dizziness  · Fainting  · Fatigue  · Shortness of breath  · Chest pain with increased activity  · A change in the usual regularity of your heartbeat, or an unusually fast heartbeat   Date Last Reviewed: 4/23/2016  © 0911-8241 [employer]. [address] [place] [address], [place] [address]. All rights reserved. This information is not intended as a substitute for professional medical care. Always follow your healthcare professional's instructions.

## 2017-08-18 DIAGNOSIS — I35.0 AORTIC VALVE STENOSIS, UNSPECIFIED ETIOLOGY: Primary | ICD-10-CM

## 2017-08-18 RX ORDER — CLONIDINE HYDROCHLORIDE 0.2 MG/1
TABLET ORAL
Qty: 30 TABLET | Refills: 0 | OUTPATIENT
Start: 2017-08-18

## 2017-08-22 NOTE — DISCHARGE SUMMARY
"[facility] Hospital Medicine  Discharge Summary      Patient Name: Jerel Howard  MRN: [id number]  Admission Date: 8/2/2017  Hospital Length of Stay: 1 days  Discharge Date and Time: 8/3/2017  8:03 PM  Attending Physician: Natalie Dover MD   Discharging Provider: Natalie Dover MD  Primary Care Provider: Rudi Barker MD    [facility] Medicine Team: [facility] HOSP MED C Natalie Dover MD    HPI: Ms Jerel Howard is a 76 y.o. [place] lady with HTN, HLD, PAD, ESRD on HD, h/o severe AS s/p TAVR 7/2016 (LEONIDES Dover), severe PHTN, h/o stroke, reported seizure disorder, anticoagulated on eliquis (?indication - prior h/o Afib per family, with stroke deemed to be embolic), who presented to [facility] today for elective revision of her L femoral AVG with Dr Espinosa,surgery went ok but she had labile BP throughout and at the end of the case she developed rapid HR 140s-150s with subsequent hypotension, deemed to be new onset Afib with RVR with hemodynamic instability, EKG done and confirmed Afib, admitting to [facility] with cardiology consult.       I discussed case with both Chelsea Rutledge MD (anesthesia staff) and Lacy Triplett MD (anesthesia resident)  She was doing okay in pre-op, then at the beginning of the case she developed malignant HTN with SBP 230s so was given a total of 12.5mg Iv of hydralazine (2.5, 2.5, 2.5, then 5), and near the end of the case she developed HR 140s-150s with no evident p waves and ST depressions on the telemetry monitor, so she was given esmolol IV 70mg total (20, 20, then 30) which helped bring HR down, but she developed hypotension and was given ephedrine (45 total) and elfego (800 total).  In the PACU, HR 110s, EKG done and confirmed Afib with RVR, and she was perfusing well. I discussed with PACU nurse around 130pm, and her SBP had been ranging 140s-150s with hR 80s-90s.  Most recent set of VS was 187/78 and 82. She c/o feeling "bad all over" with an uncomfortable feeling in her chest      Of note, " the onset of Afib with RVR preceded the administration of vasopressors  Her home med, Eliquis, has been on hold for the past 3 days per pre-op protocol.      She does not drink EtOH and has never smoked.     Currently feeling well except for a mild headache. No CP/palp    Hospital Course: Pulm edema on CXR and O2 sat 89 on RA, pending HD today. HR well-controlled in the 60s on metoprolol 25 BID. Asymptomatic. O2 sat improved after volume removal via HD (on her usual day of HD), and she was d/c-ed home.     A/P:  Afib, paroxysmal vs chronic - with RVR intra-operatively, symptomatic with fatigue and abnormal sensation of chest and ST depressions on telemetry  - started metoprolol 25 PO BID and HR 60s   - resumed home apixaban but at 2.5 BID rather than 5 BID (for body weight, per cardiology).  Ok with vascular surgery- 1st given at 2pm (had been on hold for past 3 days prior to surgery).   - checked TSH - see below  - checked trop which was low at 0.074, no EKG changes consistent with ACS, significance unclear in ESRD (decreased renal clearance of trop) and prior trop always mildly elevated  - checked BNP - 1,100 (less than 3 years ago when it was 2,965)- elevation likely due to acute CHF as below  - checked D dimer (low suspicion for PE, will check D Dimer for good negative predictive value) - elevated at 1.33 but could be false positive given recent vascular surgery. No right heart strain on Echo, no hemodynamic instability, aready on eliquis  - checked Echo (see result, below)  - cardiology consulted  - keep K+ >4 and Mg >2     Subclinical hypothyroidism  TSH > 10 and FT4 wnl  - PCP to re-check TFTs when out of acute setting and start low dose sythroid if warranted, would not start at this time given the rapid Afib.      Acute on chronic diastolic CHF/PHTN with acute hypoxemic resp failure - pulm edema on CXR and O2 sat 89 on RA  - resolved after HD     ESRD on HD - last HD 8/1 - s/p elective graft surgery on day  of admit  - ok to use revised graft per vascular surgery  - nephrology consult for HD  - home cinacalcet 60 daily and renvela 800 TID  - had HD prior to d/c home (as she is usually dialyzed at 4am on TThSat)     h/o severe AS s/p TAVR 7/2016 ([facility] [place])     severe PHTN (90 mmHg)     Borderline leukocytosis - WBC 9.58 with 81% gran - likely from stress from major vascular surgery  - check UA (if she makes urine)  - will follow     Renovascular HTN  - home valsartan 80 PO BID  - home amlodipine 10  - home clonidine 0.1 BID      h/o stroke  - not on ASA or statin  - started atorva 40  - defer to clinic MDs to start ASA if warranted     PAD  - not on ASA or statin  - started atorva 40  - defer to clinic MDs to start ASA if warranted     Aortic atheroma  - started atorva     reported seizure disorder - not on any meds     Anemia of ESRD - stable - monitor     GERD - home PPI     PseudohyperK - K+5.2 but slightly hemolyzed, K+ on iSTAT intra-op was wnl      Prophylaxis- already on home eliquis     Advance directives- full code     Post-acute care- home. F/u with primary cardiologist at [facility]    Procedure(s) (LRB):  GRAFT-ARTERIOVENOUS left femoral leg- Revision and replacement (Left)      Indwelling Lines/Drains at time of discharge:   Lines/Drains/Airways     Drain                 Hemodialysis AV Fistula Left thigh 28494 days                  Consults:   Consults         Status Ordering Provider     Inpatient consult to Cardiology  Once     Provider:  (Not yet assigned)    Completed JUNIOR RENE AN     Inpatient consult to Nephrology  Once     Provider:  (Not yet assigned)    Completed KAILA JO          Significant Diagnostic Studies:     8/3  2D Echo with CFD  CONCLUSIONS     1 - Concentric hypertrophy.     2 - No wall motion abnormalities.     3 - Normal left ventricular systolic function (EF 60-65%).     4 - Normal right ventricular systolic function .     5 - Biatrial enlargement.     6 - Pulmonary  hypertension. The estimated PA systolic pressure is 111 mmHg.     7 - S/P transcatheter AVR, effective prosthetic valve area corrected for BSA is 0.77 cm2.     8 - Mild aortic regurgitation.     9 - Moderate tricuspid regurgitation.     10 - Mild to moderate pulmonic regurgitation.     11 - Trivial pericardial effusion.     12 - Increased central venous pressure.     13 - S/p 26mm Evolut TF TAVR.     Pending Diagnostic Studies:     Procedure Component Value Units Date/Time    CBC auto differential [[id number]] Collected:  08/02/17 0749    Order Status:  Sent Lab Status:  In process Updated:  08/02/17 0749    Specimen:  Blood from Blood     Urinalysis [[id number]] Collected:  08/02/17 1410    Order Status:  Sent Lab Status:  In process Updated:  08/02/17 1411    Specimen:  Urine         Final Active Diagnoses:    Diagnosis Date Noted POA    PRINCIPAL PROBLEM:  Atrial fibrillation with rapid ventricular response [I48.91] 08/02/2017 Yes    Acute on chronic diastolic CHF (congestive heart failure) [I50.33] 08/03/2017 Yes    Acute hypoxemic respiratory failure [J96.01] 08/03/2017 Yes    Subclinical hypothyroidism [E03.9] 08/03/2017 Yes    Atheroma of artery [I70.90] 08/03/2017 Yes    History of stroke [Z86.73] 08/03/2017 Not Applicable    Hyperlipidemia [E78.5] 08/03/2017 Yes    Moderate to severe pulmonary hypertension [I27.2] 08/03/2017 Yes    ESRD on hemodialysis [N18.6, Z99.2] 08/03/2017 Not Applicable     Chronic    Anemia in chronic kidney disease, on chronic dialysis [N18.6, D63.1, Z99.2] 08/03/2017 Not Applicable     Chronic    Chronic kidney disease-mineral and bone disorder [N18.9, E83.9, M89.9] 08/03/2017 Yes     Chronic    Acute respiratory failure with hypoxemia [J96.01] 08/03/2017 Yes    ST segment depression [R94.31] 08/03/2017 Yes    Complication of vascular access for dialysis [T82.9XXA] 08/02/2017 Yes    Atrial fibrillation [I48.91] 08/02/2017 Yes    New onset atrial fibrillation [I48.91]  08/02/2017 Yes    Atrial fibrillation with RVR [I48.91] 08/02/2017 Yes    Aortic stenosis [I35.0] 06/13/2016 Yes     Chronic    PAD (peripheral artery disease) [I73.9] 06/13/2016 Yes     Chronic      Problems Resolved During this Admission:    Diagnosis Date Noted Date Resolved POA      Discharged Condition: stable    Disposition: Home or Self Care    Follow Up:  Follow-up Information     CEE Espinosa Iii, MD In 2 weeks.    Specialty:  Vascular Surgery  Contact information:  Nancy PACKER  [facility] [address]  [phone]                 Patient Instructions:     VAS US Hemodialysis Access   Standing Status: Future  Standing Exp. Date: 08/03/18     Diet Diabetic 1800 Calories     Diet Cardiac     Activity as tolerated     Call MD for:  temperature >100.4     Call MD for:  severe uncontrolled pain     Call MD for:  difficulty breathing or increased cough     Call MD for:  worsening rash     Call MD for:  persistent dizziness, light-headedness, or visual disturbances       Medications:  Reconciled Home Medications:   Discharge Medication List as of 8/3/2017  6:54 PM      START taking these medications    Details   atorvastatin (LIPITOR) 40 MG tablet Take 1 tablet (40 mg total) by mouth every evening., Starting Thu 8/3/2017, Until Sat 9/2/2017, Normal      hydrocodone-acetaminophen 5-325mg (NORCO) 5-325 mg per tablet Take 1-2 tablets by mouth every 4 (four) hours as needed for Pain., Starting Wed 8/2/2017, Print      metoprolol tartrate (LOPRESSOR) 25 MG tablet Take 1 tablet (25 mg total) by mouth 2 (two) times daily., Starting Thu 8/3/2017, Until Sat 9/2/2017, Normal         CONTINUE these medications which have CHANGED    Details   apixaban (ELIQUIS) 2.5 mg Tab Take 1 tablet (2.5 mg total) by mouth 2 (two) times daily., Starting Thu 8/3/2017, Normal         CONTINUE these medications which have NOT CHANGED    Details   cloNIDine (CATAPRES) 0.2 MG tablet Take 0.5 tablets (0.1 mg total) by mouth 2  (two) times daily., Starting 7/15/2016, Until Discontinued, Normal      FOLIC ACID/VITAMIN B COMP W-C (TRIPHROCAPS ORAL) Take 1 capsule by mouth., Until Discontinued, Historical Med      furosemide (LASIX) 40 MG tablet Take 1 tablet (40 mg total) by mouth 2 (two) times daily., Starting Wed 5/15/2013, Until Wed 7/19/2017, Print      lidocaine-prilocaine (EMLA) cream Starting 1/8/2014, Until Discontinued, Historical Med      pantoprazole (PROTONIX) 40 MG tablet Take 40 mg by mouth once daily., Historical Med      sevelamer carbonate (RENVELA) 800 mg Tab Take 1 tablet (800 mg total) by mouth 3 (three) times daily with meals., Starting Wed 5/15/2013, Until Wed 8/2/2017, Print      valsartan (DIOVAN) 80 MG tablet TK 1 T PO  BID, Historical Med         STOP taking these medications       amlodipine (NORVASC) 10 MG tablet Comments:   Reason for Stopping:         cinacalcet (SENSIPAR) 60 MG Tab Comments:   Reason for Stopping:             Time spent on the discharge of patient: 50 minutes         Natalie Dover MD  Department of Hospital Medicine  [facility]

## 2017-08-23 NOTE — PHYSICIAN QUERY
"PT Name: Jerel Howard  MR #: [id number]    Physician Query Form - Atrial Fibrillation Specificity     CDS/: Vladislav Machuca Jr RN               Contact information:ext [id number]   Reminded  by  that since "VS" was still noted on DC summary in respect to the two Afib diagnosis that no query can be sent.  This form is a permanent document in the medical record.     Query Date: August 23, 2017    By submitting this query, we are merely seeking further clarification of documentation. Please utilize your independent clinical judgment when addressing the question(s) below.    The medical record contains the following:   Indicators     Supporting Clinical Findings Location in Medical Record   x Atrial Fibrillation Chief Complaint/Reason for Admission:   New onset Afib    prior h/o Afib per family    Afib, paroxysmal vs chronic  8/2 H&P      8/2 H&P    8/3 Discharge Summary   x EKG results Atrial fibrillation with rapid ventricular response with PVC    Atrial fibrillation has replaced Sinus rhythm 8/2 EKG Report    8/2 EKG Report    Medication     x Treatment Atrial fibrillation with RVR. Better rate control now. Recommend restarting eliquis at a lower dose  8/2 Cardiology Consult Note    Other         Provider, please further specify the Atrial Fibrillation diagnosis.    [  ] Chronic  [  ] Paroxysmal  [  ] Other (please specify): ____________________________  [  ] Clinically Undetermined    Please document in your progress notes daily for the duration of treatment until resolved, and include in your discharge summary.    "

## 2017-08-25 ENCOUNTER — HOSPITAL ENCOUNTER (OUTPATIENT)
Dept: VASCULAR SURGERY | Facility: CLINIC | Age: 77
Discharge: HOME | End: 2017-08-25
Attending: STUDENT IN AN ORGANIZED HEALTH CARE EDUCATION/TRAINING PROGRAM
Payer: MEDICARE

## 2017-08-25 ENCOUNTER — OFFICE VISIT (OUTPATIENT)
Dept: VASCULAR SURGERY | Facility: CLINIC | Age: 77
End: 2017-08-25
Payer: MEDICARE

## 2017-08-25 VITALS
DIASTOLIC BLOOD PRESSURE: 92 MMHG | HEIGHT: 59 IN | BODY MASS INDEX: 25.8 KG/M2 | TEMPERATURE: 97 F | WEIGHT: 128 LBS | SYSTOLIC BLOOD PRESSURE: 172 MMHG | HEART RATE: 75 BPM

## 2017-08-25 DIAGNOSIS — Z99.2 ESRD ON DIALYSIS: ICD-10-CM

## 2017-08-25 DIAGNOSIS — Z99.2 ESRD ON DIALYSIS: Primary | ICD-10-CM

## 2017-08-25 DIAGNOSIS — Z99.2 ESRD (END STAGE RENAL DISEASE) ON DIALYSIS: Primary | ICD-10-CM

## 2017-08-25 DIAGNOSIS — N18.6 ESRD ON DIALYSIS: Primary | ICD-10-CM

## 2017-08-25 DIAGNOSIS — N18.6 ESRD ON DIALYSIS: ICD-10-CM

## 2017-08-25 DIAGNOSIS — T82.9XXA COMPLICATION OF VASCULAR ACCESS FOR DIALYSIS, INITIAL ENCOUNTER: ICD-10-CM

## 2017-08-25 DIAGNOSIS — N18.6 ESRD (END STAGE RENAL DISEASE) ON DIALYSIS: Primary | ICD-10-CM

## 2017-08-25 PROCEDURE — 93990 DOPPLER FLOW TESTING: CPT | Mod: 26,S$PBB,, | Performed by: SURGERY

## 2017-08-25 PROCEDURE — 99024 POSTOP FOLLOW-UP VISIT: CPT | Mod: ,,, | Performed by: SURGERY

## 2017-08-25 PROCEDURE — 99213 OFFICE O/P EST LOW 20 MIN: CPT | Mod: PBBFAC | Performed by: SURGERY

## 2017-08-25 PROCEDURE — 99999 PR PBB SHADOW E&M-EST. PATIENT-LVL III: CPT | Mod: PBBFAC,,, | Performed by: SURGERY

## 2017-08-25 NOTE — PROGRESS NOTES
See my prior note; review of systems, family history and social history are   unchanged.    HISTORY OF PRESENT ILLNESS:  A 76-year-old female with end-stage renal disease,   well known to me, status post:  1.  Acuseal revision, left femoral AV graft, 08/02/2016.  2.  Prior multiple interventions of right graft including angioplasty in 2017   and 2016.  3.  Prior stent placement in the left iliac artery and angioplasty of the left   SFA, 03/10/2014.  4.  Original left femoral AV graft placement, 03/12/2008.  5.  Upper extremity arteriogram and tibioperoneal stent placement in July 2016   after TAVR (Dr. Dover).    She now returns.  Despite a revision was done for an erosion into the graft.    She is using the Acuseal revision without difficulty.    PHYSICAL EXAMINATION:  VITAL SIGNS:  See nursing note.  EXTREMITIES:  Left leg shows the Acuseal graft with a soft thrill and no   pulsatility.  The incisions with nylons were in place.    IMAGING:  Duplex shows it to be patent with no stenosis and flow volume of 1.4   liters.    ASSESSMENT:  Well functioning revised left femoral AV graft, now in its ninth   year of cumulative patency.    PLAN:  Follow up in four months with surveillance duplex, sooner if clinically   indicated.      SANDRA/JENNIFER  dd: 08/25/2017 09:18:57 (CDT)  td: 08/25/2017 22:01:08 (CDT)  Doc ID   #[id number]  Job ID #[id number]    CC:

## 2017-11-03 ENCOUNTER — OFFICE VISIT (OUTPATIENT)
Dept: VASCULAR SURGERY | Facility: CLINIC | Age: 77
End: 2017-11-03
Payer: MEDICARE

## 2017-11-03 ENCOUNTER — TELEPHONE (OUTPATIENT)
Dept: VASCULAR SURGERY | Facility: CLINIC | Age: 77
End: 2017-11-03

## 2017-11-03 ENCOUNTER — HOSPITAL ENCOUNTER (OUTPATIENT)
Dept: VASCULAR SURGERY | Facility: CLINIC | Age: 77
Discharge: HOME | End: 2017-11-03
Attending: SURGERY
Payer: MEDICARE

## 2017-11-03 ENCOUNTER — ANESTHESIA EVENT (OUTPATIENT)
Dept: SURGERY | Facility: HOSPITAL | Age: 77
End: 2017-11-03
Payer: MEDICARE

## 2017-11-03 VITALS
TEMPERATURE: 98 F | WEIGHT: 125.38 LBS | DIASTOLIC BLOOD PRESSURE: 66 MMHG | HEIGHT: 62 IN | HEART RATE: 71 BPM | BODY MASS INDEX: 23.07 KG/M2 | SYSTOLIC BLOOD PRESSURE: 149 MMHG

## 2017-11-03 DIAGNOSIS — T82.590A AV GRAFT MALFUNCTION: ICD-10-CM

## 2017-11-03 DIAGNOSIS — Z99.2 ESRD (END STAGE RENAL DISEASE) ON DIALYSIS: ICD-10-CM

## 2017-11-03 DIAGNOSIS — T82.590D AV GRAFT MALFUNCTION, SUBSEQUENT ENCOUNTER: Primary | ICD-10-CM

## 2017-11-03 DIAGNOSIS — T82.858D STENOSIS OF ARTERIOVENOUS DIALYSIS FISTULA, SUBSEQUENT ENCOUNTER: ICD-10-CM

## 2017-11-03 DIAGNOSIS — Z01.818 PREOP EXAMINATION: Primary | ICD-10-CM

## 2017-11-03 DIAGNOSIS — N18.6 ESRD (END STAGE RENAL DISEASE) ON DIALYSIS: ICD-10-CM

## 2017-11-03 PROCEDURE — 93990 DOPPLER FLOW TESTING: CPT | Mod: 26,S$PBB,, | Performed by: SURGERY

## 2017-11-03 PROCEDURE — 99213 OFFICE O/P EST LOW 20 MIN: CPT | Mod: S$PBB,,, | Performed by: SURGERY

## 2017-11-03 PROCEDURE — 99213 OFFICE O/P EST LOW 20 MIN: CPT | Mod: PBBFAC | Performed by: SURGERY

## 2017-11-03 PROCEDURE — 99999 PR PBB SHADOW E&M-EST. PATIENT-LVL III: CPT | Mod: PBBFAC,,, | Performed by: SURGERY

## 2017-11-03 PROCEDURE — 93990 DOPPLER FLOW TESTING: CPT | Mod: PBBFAC | Performed by: SURGERY

## 2017-11-03 RX ORDER — MUPIROCIN 20 MG/G
OINTMENT TOPICAL
Status: CANCELLED | OUTPATIENT
Start: 2017-11-03

## 2017-11-03 RX ORDER — LIDOCAINE HYDROCHLORIDE 10 MG/ML
1 INJECTION, SOLUTION EPIDURAL; INFILTRATION; INTRACAUDAL; PERINEURAL ONCE
Status: CANCELLED | OUTPATIENT
Start: 2017-11-03 | End: 2017-11-03

## 2017-11-03 NOTE — ANESTHESIA PREPROCEDURE EVALUATION
11/03/2017  Pre-operative evaluation for Procedure(s) (LRB):  Left thigh AV graft REVISION, fistulogram (Left)    Jerel Howard is a 77 y.o. female with PMHx of severe AS (s/p AVR in 2016), pulmonary HTN (PA systolic 111mmHg on most recent Echo), a fib, TIA, PAD, ESRD on HD (T/Th/S), and anemia who is now scheduled for above procedure 2/2 AV graft malfunction.    LDA:     Hemodialysis AV Fistula Left thigh   Hemodialysis AV Fistula - Properties Group No Placement Date or Time found. Present Prior to Hospital Arrival?: Yes Location: Left thigh       Prev airway: Present Prior to Hospital Arrival?: No; Method of Intubation: Direct laryngoscopy; Inserted by: Other (Mirella GUZMAN); Airway Device: Endotracheal Tube; Mask Ventilation: Easy - oral; Intubated: Postinduction; Blade: Shweta #3; Airway Device Size: 7.0; Style: Cuffed; Cuff Inflation: Minimal occlusive pressure; Inflation Amount: 5; Placement Verified By: Auscultation, Capnometry; Grade: Grade I; Complicating Factors: None; Intubation Findings: Positive EtCO2, Bilateral breath sounds, Atraumatic/Condition of teeth unchanged;  Depth of Insertion: 23; Securment: Lips; Complications: None; Breath Sounds: Equal Bilateral; Insertion Attempts: 1; Removal Date: 08/02/17;  Removal Time: 1016    Patient Active Problem List   Diagnosis    Hypertensive crisis    Malfunction of arteriovenous dialysis fistula    ESRD (end stage renal disease) on dialysis    Aortic stenosis    PAD (peripheral artery disease)    Aortic valve stenosis    Severe aortic stenosis    Nodular calcific aortic valve stenosis    Encounter for management of temporary pacemaker    Complications due to renal dialysis device, implant, and graft    Stenosis of arteriovenous dialysis fistula    AV graft malfunction    AVF (arteriovenous fistula)    Dependence on hemodialysis     Complication of vascular access for dialysis    Atrial fibrillation    New onset atrial fibrillation    Atrial fibrillation with rapid ventricular response    Atrial fibrillation with RVR    Acute on chronic diastolic CHF (congestive heart failure)    Acute hypoxemic respiratory failure    Subclinical hypothyroidism    Atheroma of artery    History of stroke    Hyperlipidemia    Moderate to severe pulmonary hypertension    ESRD on hemodialysis    Anemia in chronic kidney disease, on chronic dialysis    Chronic kidney disease-mineral and bone disorder    Acute respiratory failure with hypoxemia    ST segment depression       Review of patient's allergies indicates:  No Known Allergies     No current facility-administered medications on file prior to encounter.      Current Outpatient Prescriptions on File Prior to Encounter   Medication Sig Dispense Refill    apixaban (ELIQUIS) 2.5 mg Tab Take 1 tablet (2.5 mg total) by mouth 2 (two) times daily. 30 tablet 0    atorvastatin (LIPITOR) 40 MG tablet Take 1 tablet (40 mg total) by mouth every evening. 30 tablet 0    cloNIDine (CATAPRES) 0.2 MG tablet Take 0.5 tablets (0.1 mg total) by mouth 2 (two) times daily. 30 tablet 11    FOLIC ACID/VITAMIN B COMP W-C (TRIPHROCAPS ORAL) Take 1 capsule by mouth.      furosemide (LASIX) 40 MG tablet Take 1 tablet (40 mg total) by mouth 2 (two) times daily. (Patient taking differently: Take 80 mg by mouth once daily. 4x  weekly) 60 tablet 11    hydrocodone-acetaminophen 5-325mg (NORCO) 5-325 mg per tablet Take 1-2 tablets by mouth every 4 (four) hours as needed for Pain. 31 tablet 0    lidocaine-prilocaine (EMLA) cream       metoprolol tartrate (LOPRESSOR) 25 MG tablet Take 1 tablet (25 mg total) by mouth 2 (two) times daily. 60 tablet 0    pantoprazole (PROTONIX) 40 MG tablet Take 40 mg by mouth once daily.      sevelamer carbonate (RENVELA) 800 mg Tab Take 1 tablet (800 mg total) by mouth 3 (three) times  daily with meals. 90 tablet 11    valsartan (DIOVAN) 80 MG tablet TK 1 T PO  BID  6       Past Surgical History:   Procedure Laterality Date    AV FISTULA PLACEMENT      CORNEAL TRANSPLANT      B eyes    HYSTERECTOMY      UTERINE FIBROID SURGERY         Social History     Social History    Marital status: Single     Spouse name: N/A    Number of children: N/A    Years of education: N/A     Occupational History    Not on file.     Social History Main Topics    Smoking status: Never Smoker    Smokeless tobacco: Never Used    Alcohol use No    Drug use: No    Sexual activity: Not on file     Other Topics Concern    Not on file     Social History Narrative    No narrative on file         Vital Signs Range (Last 24H):  Temp:  [36.8 °C (98.2 °F)]   Pulse:  [71]   BP: (149)/(66)       CBC:   Recent Labs      11/03/17   1235   WBC  8.72   RBC  2.81*   HGB  9.1*   HCT  29.3*   PLT  240   MCV  104*   MCH  32.4*   MCHC  31.1*       CMP: No results for input(s): NA, K, CL, CO2, BUN, CREATININE, GLU, MG, PHOS, CALCIUM, ALBUMIN, PROT, ALKPHOS, ALT, AST, BILITOT in the last 72 hours.    INR  No results for input(s): INR, PROTIME, APTT in the last 72 hours.    Invalid input(s): PT        Diagnostic Studies:      EKG:  Atrial fibrillation with rapid ventricular response with PVC  Left axis deviation  Incomplete right bundle branch block  Nonspecific ST and T wave abnormality , probably digitalis effect  Abnormal ECG  When compared with ECG of 15-JUL-2016 10:04,  Atrial fibrillation has replaced Sinus rhythm  Vent. rate has increased BY 62 BPM  ST now depressed in Lateral leads  Confirmed by GREG WASHINGTON, HOMEYAR ([id number]) on 8/3/2017 10:30:24 AM    2D Echo:  CONCLUSIONS     1 - Concentric hypertrophy.     2 - No wall motion abnormalities.     3 - Normal left ventricular systolic function (EF 60-65%).     4 - Normal right ventricular systolic function .     5 - Biatrial enlargement.     6 - Pulmonary hypertension. The  estimated PA systolic pressure is 111 mmHg.     7 - S/P transcatheter AVR, effective prosthetic valve area corrected for BSA is 0.77 cm2.     8 - Mild aortic regurgitation.     9 - Moderate tricuspid regurgitation.     10 - Mild to moderate pulmonic regurgitation.     11 - Trivial pericardial effusion.     12 - Increased central venous pressure.     13 - S/p 26mm Evolut TF TAVR.         Anesthesia Evaluation    I have reviewed the Patient Summary Reports.    I have reviewed the Nursing Notes.      Review of Systems  Anesthesia Hx:  No problems with previous Anesthesia  History of prior surgery of interest to airway management or planning: Denies Family Hx of Anesthesia complications.   Denies Personal Hx of Anesthesia complications.   Social:  Non-Smoker    Cardiovascular:   Hypertension Valvular problems/Murmurs Dysrhythmias atrial fibrillation CHF PVD AMAYA NYHA Classification III ECG has been reviewed. H/o TAVR 2016, PAH    Pulmonary:   Shortness of breath    Renal/:   Chronic Renal Disease, ESRD, Dialysis    Hepatic/GI:  Hepatic/GI Normal    Neurological:   CVA Seizures    Endocrine:   Hypothyroidism        Physical Exam  General:  Well nourished    Airway/Jaw/Neck:  Airway Findings: Mouth Opening: Normal Tongue: Normal  General Airway Assessment: Adult  Mallampati: III  Improves to II with phonation.  TM Distance: Normal, at least 6 cm  Jaw/Neck Findings:  Neck ROM: Normal ROM     Eyes/Ears/Nose:  Eyes/Ears/Nose Findings:    Dental:  Dental Findings: Upper Dentures, Lower Dentures   Chest/Lungs:  Chest/Lungs Findings: Clear to auscultation, Normal Respiratory Rate     Heart/Vascular:  Heart Findings: Rate: Normal  Rhythm: Regular Rhythm  Sounds: Normal  Heart Murmur  Systolic  Systolic Heart Murmur Description: L Upper Sternal Border, Holosystolic, Ejection Type, Radiates to Carotids  Systolic Heart Murmur Grade: Grade III  Vascular Findings:  Dialysis Access: Left Forearm Graft, Right Forearm Graft   Vascular Exam Findings: Left femoral AV fistula        Mental Status:  Mental Status Findings:  Cooperative, Alert and Oriented         Anesthesia Plan  Type of Anesthesia, risks & benefits discussed:  Anesthesia Type:  regional, MAC  Patient's Preference: General  Intra-op Monitoring Plan: standard ASA monitors  Intra-op Monitoring Plan Comments:   Post Op Pain Control Plan:   Post Op Pain Control Plan Comments:   Induction:   IV  Beta Blocker:  Patient is not currently on a Beta-Blocker (No further documentation required).       Informed Consent: Patient understands risks and agrees with Anesthesia plan.  Questions answered. Anesthesia consent signed with patient.  ASA Score: 4     Day of Surgery Review of History & Physical: I have interviewed and examined the patient. I have reviewed the patient's H&P dated:  There are no significant changes.  H&P update referred to the surgeon.         Ready For Surgery From Anesthesia Perspective.

## 2017-11-06 ENCOUNTER — SURGERY (OUTPATIENT)
Age: 77
End: 2017-11-06

## 2017-11-06 ENCOUNTER — ANESTHESIA (OUTPATIENT)
Dept: SURGERY | Facility: HOSPITAL | Age: 77
End: 2017-11-06
Payer: MEDICARE

## 2017-11-06 ENCOUNTER — HOSPITAL ENCOUNTER (OUTPATIENT)
Facility: HOSPITAL | Age: 77
Discharge: HOME | End: 2017-11-06
Attending: SURGERY | Admitting: SURGERY
Payer: MEDICARE

## 2017-11-06 DIAGNOSIS — T82.590A AV GRAFT MALFUNCTION: ICD-10-CM

## 2017-11-06 DIAGNOSIS — T82.590D AV GRAFT MALFUNCTION, SUBSEQUENT ENCOUNTER: ICD-10-CM

## 2017-11-06 PROCEDURE — 36000706: Performed by: SURGERY

## 2017-11-06 PROCEDURE — 63600175 PHARM REV CODE 636 W HCPCS: Performed by: STUDENT IN AN ORGANIZED HEALTH CARE EDUCATION/TRAINING PROGRAM

## 2017-11-06 PROCEDURE — 76942 ECHO GUIDE FOR BIOPSY: CPT | Performed by: ANESTHESIOLOGY

## 2017-11-06 PROCEDURE — 25000003 PHARM REV CODE 250: Performed by: SURGERY

## 2017-11-06 PROCEDURE — 71000033 HC RECOVERY, INTIAL HOUR: Performed by: SURGERY

## 2017-11-06 PROCEDURE — D9220A PRA ANESTHESIA: Mod: ,,, | Performed by: ANESTHESIOLOGY

## 2017-11-06 PROCEDURE — 37000008 HC ANESTHESIA 1ST 15 MINUTES: Performed by: SURGERY

## 2017-11-06 PROCEDURE — 63600175 PHARM REV CODE 636 W HCPCS: Performed by: SURGERY

## 2017-11-06 PROCEDURE — 27200651 HC AIRWAY, LMA: Performed by: STUDENT IN AN ORGANIZED HEALTH CARE EDUCATION/TRAINING PROGRAM

## 2017-11-06 PROCEDURE — 71000039 HC RECOVERY, EACH ADD'L HOUR: Performed by: SURGERY

## 2017-11-06 PROCEDURE — 71000015 HC POSTOP RECOV 1ST HR: Performed by: SURGERY

## 2017-11-06 PROCEDURE — C1768 GRAFT, VASCULAR: HCPCS | Performed by: SURGERY

## 2017-11-06 PROCEDURE — 25000003 PHARM REV CODE 250: Performed by: ANESTHESIOLOGY

## 2017-11-06 PROCEDURE — 36830 ARTERY-VEIN NONAUTOGRAFT: CPT | Mod: ,,, | Performed by: SURGERY

## 2017-11-06 PROCEDURE — 63600175 PHARM REV CODE 636 W HCPCS: Performed by: ANESTHESIOLOGY

## 2017-11-06 PROCEDURE — 37000009 HC ANESTHESIA EA ADD 15 MINS: Performed by: SURGERY

## 2017-11-06 PROCEDURE — 35903 EXCISION GRAFT EXTREMITY: CPT | Mod: 51,,, | Performed by: SURGERY

## 2017-11-06 PROCEDURE — 36000707: Performed by: SURGERY

## 2017-11-06 DEVICE — IMPLANTABLE DEVICE: Type: IMPLANTABLE DEVICE | Site: GROIN | Status: FUNCTIONAL

## 2017-11-06 RX ORDER — ONDANSETRON 2 MG/ML
INJECTION INTRAMUSCULAR; INTRAVENOUS
Status: DISCONTINUED | OUTPATIENT
Start: 2017-11-06 | End: 2017-11-06

## 2017-11-06 RX ORDER — DESMOPRESSIN ACETATE 4 UG/ML
INJECTION, SOLUTION INTRAVENOUS; SUBCUTANEOUS
Status: DISCONTINUED | OUTPATIENT
Start: 2017-11-06 | End: 2017-11-06

## 2017-11-06 RX ORDER — SODIUM CHLORIDE 0.9 % (FLUSH) 0.9 %
3 SYRINGE (ML) INJECTION
Status: DISCONTINUED | OUTPATIENT
Start: 2017-11-06 | End: 2017-11-06 | Stop reason: HOSPADM

## 2017-11-06 RX ORDER — FENTANYL CITRATE 50 UG/ML
25 INJECTION, SOLUTION INTRAMUSCULAR; INTRAVENOUS EVERY 5 MIN PRN
Status: DISCONTINUED | OUTPATIENT
Start: 2017-11-06 | End: 2017-11-06 | Stop reason: HOSPADM

## 2017-11-06 RX ORDER — MIDAZOLAM HYDROCHLORIDE 1 MG/ML
0.5 INJECTION INTRAMUSCULAR; INTRAVENOUS EVERY 5 MIN PRN
Status: DISCONTINUED | OUTPATIENT
Start: 2017-11-06 | End: 2017-11-06 | Stop reason: HOSPADM

## 2017-11-06 RX ORDER — HYDROCODONE BITARTRATE AND ACETAMINOPHEN 5; 325 MG/1; MG/1
1 TABLET ORAL EVERY 4 HOURS PRN
Status: DISCONTINUED | OUTPATIENT
Start: 2017-11-06 | End: 2017-11-06 | Stop reason: HOSPADM

## 2017-11-06 RX ORDER — HEPARIN SODIUM 1000 [USP'U]/ML
INJECTION, SOLUTION INTRAVENOUS; SUBCUTANEOUS
Status: DISCONTINUED | OUTPATIENT
Start: 2017-11-06 | End: 2017-11-06

## 2017-11-06 RX ORDER — HEPARIN SODIUM 1000 [USP'U]/ML
INJECTION, SOLUTION INTRAVENOUS; SUBCUTANEOUS
Status: DISCONTINUED | OUTPATIENT
Start: 2017-11-06 | End: 2017-11-06 | Stop reason: HOSPADM

## 2017-11-06 RX ORDER — PROPOFOL 10 MG/ML
VIAL (ML) INTRAVENOUS
Status: DISCONTINUED | OUTPATIENT
Start: 2017-11-06 | End: 2017-11-06

## 2017-11-06 RX ORDER — ONDANSETRON 2 MG/ML
4 INJECTION INTRAMUSCULAR; INTRAVENOUS DAILY PRN
Status: DISCONTINUED | OUTPATIENT
Start: 2017-11-06 | End: 2017-11-06 | Stop reason: HOSPADM

## 2017-11-06 RX ORDER — PROTAMINE SULFATE 10 MG/ML
INJECTION, SOLUTION INTRAVENOUS
Status: DISCONTINUED | OUTPATIENT
Start: 2017-11-06 | End: 2017-11-06

## 2017-11-06 RX ORDER — HYDROCODONE BITARTRATE AND ACETAMINOPHEN 5; 325 MG/1; MG/1
1 TABLET ORAL EVERY 6 HOURS PRN
Qty: 40 TABLET | Refills: 0 | Status: SHIPPED | OUTPATIENT
Start: 2017-11-06

## 2017-11-06 RX ORDER — SODIUM CHLORIDE 9 MG/ML
INJECTION, SOLUTION INTRAVENOUS CONTINUOUS
Status: DISCONTINUED | OUTPATIENT
Start: 2017-11-06 | End: 2017-11-06 | Stop reason: HOSPADM

## 2017-11-06 RX ORDER — LIDOCAINE HYDROCHLORIDE 10 MG/ML
1 INJECTION, SOLUTION EPIDURAL; INFILTRATION; INTRACAUDAL; PERINEURAL ONCE
Status: COMPLETED | OUTPATIENT
Start: 2017-11-06 | End: 2017-11-06

## 2017-11-06 RX ORDER — MUPIROCIN 20 MG/G
OINTMENT TOPICAL
Status: DISCONTINUED | OUTPATIENT
Start: 2017-11-06 | End: 2017-11-06 | Stop reason: HOSPADM

## 2017-11-06 RX ADMIN — HYDROCODONE BITARTRATE AND ACETAMINOPHEN 1 TABLET: 5; 325 TABLET ORAL at 02:11

## 2017-11-06 RX ADMIN — FENTANYL CITRATE 25 MCG: 50 INJECTION, SOLUTION INTRAMUSCULAR; INTRAVENOUS at 11:11

## 2017-11-06 RX ADMIN — SODIUM CHLORIDE: 0.9 INJECTION, SOLUTION INTRAVENOUS at 10:11

## 2017-11-06 RX ADMIN — VANCOMYCIN HYDROCHLORIDE 1000 MG: 1 INJECTION, POWDER, LYOPHILIZED, FOR SOLUTION INTRAVENOUS at 11:11

## 2017-11-06 RX ADMIN — HEPARIN SODIUM 10000 UNITS: 1000 INJECTION, SOLUTION INTRAVENOUS; SUBCUTANEOUS at 11:11

## 2017-11-06 RX ADMIN — GELATIN ABSORBABLE SPONGE SIZE 100 1 APPLICATOR: MISC at 12:11

## 2017-11-06 RX ADMIN — FENTANYL CITRATE 25 MCG: 50 INJECTION INTRAMUSCULAR; INTRAVENOUS at 02:11

## 2017-11-06 RX ADMIN — HEPARIN SODIUM 5000 UNITS: 1000 INJECTION, SOLUTION INTRAVENOUS; SUBCUTANEOUS at 12:11

## 2017-11-06 RX ADMIN — PROTAMINE SULFATE 40 MG: 10 INJECTION, SOLUTION INTRAVENOUS at 01:11

## 2017-11-06 RX ADMIN — THROMBIN, TOPICAL (BOVINE) 5000 UNITS: KIT at 12:11

## 2017-11-06 RX ADMIN — VANCOMYCIN HYDROCHLORIDE 1000 MG: 1 INJECTION, POWDER, LYOPHILIZED, FOR SOLUTION INTRAVENOUS at 10:11

## 2017-11-06 RX ADMIN — MIDAZOLAM HYDROCHLORIDE 1 MG: 1 INJECTION, SOLUTION INTRAMUSCULAR; INTRAVENOUS at 11:11

## 2017-11-06 RX ADMIN — DESMOPRESSIN ACETATE 17.01 MCG: 4 SOLUTION INTRAVENOUS at 01:11

## 2017-11-06 RX ADMIN — ONDANSETRON 4 MG: 2 INJECTION INTRAMUSCULAR; INTRAVENOUS at 01:11

## 2017-11-06 RX ADMIN — MUPIROCIN: 20 OINTMENT TOPICAL at 10:11

## 2017-11-06 RX ADMIN — PROPOFOL 40 MG: 10 INJECTION, EMULSION INTRAVENOUS at 11:11

## 2017-11-06 RX ADMIN — LIDOCAINE HYDROCHLORIDE 10 MG: 10 INJECTION, SOLUTION EPIDURAL; INFILTRATION; INTRACAUDAL; PERINEURAL at 10:11

## 2017-11-06 NOTE — INTERVAL H&P NOTE
The patient has been examined and the H&P has been reviewed:    I concur with the findings and no changes have occurred since H&P was written.    Anesthesia/Surgery risks, benefits and alternative options discussed and understood by patient/family.          Active Hospital Problems    Diagnosis  POA    AV graft malfunction [T82.590A]  Yes      Resolved Hospital Problems    Diagnosis Date Resolved POA   No resolved problems to display.

## 2017-11-06 NOTE — BRIEF OP NOTE
[facility]  Brief Operative Note     SUMMARY     Surgery Date: 11/6/2017     Surgeon(s) and Role:     * CEE Espinosa III, MD - Primary     * Leidy Beaver MD - Fellow    Assisting Surgeon: None    Pre-op Diagnosis:  AV graft malfunction, subsequent encounter [T82.590D]    Post-op Diagnosis:  Post-Op Diagnosis Codes:     * AV graft malfunction, subsequent encounter [T82.590D]    PROCEDURES:    1.  Revision, L femoral AVG with interposition 6 mm PTFE  2. Partial excision, L femoral AVF    Anesthesia: LMA    Description of the findings of the procedure: as above    Findings/Key Components: above    Estimated Blood Loss:  25cc       Specimens:   Specimen (12h ago through future)    None          Discharge Note    SUMMARY     Admit Date: 11/6/2017    Discharge Date and Time: 11/6/17    Hospital Course (synopsis of major diagnoses, care, treatment, and services provided during the course of the hospital stay): successful outpatient procedure    Final Diagnosis: Post-Op Diagnosis Codes:     * AV graft malfunction, subsequent encounter [T82.590D]    Disposition: home    Follow Up/Patient Instructions: Diet: renal  Act: ad alee  FU: 2 week with AVF duplex     Medications: pre-op+ analgesic

## 2017-11-06 NOTE — PLAN OF CARE
Pt and daughter state ready for discharge. No complaints of pain reported. Able to tolerate PO intake. Discharge instructions reviewed with pt and daughter, understanding verbalized. Paper prescriptions given. Pt transported in personal wheelchair.

## 2017-11-06 NOTE — ANESTHESIA PROCEDURE NOTES
TAP single injection    Patient location during procedure: OR    Reason for block: primary anesthetic   Diagnosis: end stage renal disease   Start time: 11/6/2017 8:20 AM  Timeout: 11/6/2017 8:15 AM   End time: 11/6/2017 8:25 AM  Staffing  Resident/CRNA: ART ROBERTO  Performed: resident/CRNA   Preanesthetic Checklist  Completed: patient identified, site marked, surgical consent, pre-op evaluation, timeout performed, IV checked, risks and benefits discussed and monitors and equipment checked  Peripheral Block  Patient position: supine  Prep: ChloraPrep  Patient monitoring: cardiac monitor, heart rate, continuous pulse ox, continuous capnometry and frequent blood pressure checks  Block type: TAP  Laterality: left  Injection technique: single shot  Needle  Needle type: Stimuplex   Needle gauge: 21 G  Needle length: 4 in  Needle localization: ultrasound guidance   -ultrasound image captured on disc.  Assessment  Injection assessment: negative aspiration, negative parasthesia and local visualized surrounding nerve  Paresthesia pain: none  Heart rate change: no  Slow fractionated injection: yes  Medications:  Bolus administered: 15 mL of 1.0 ropivacaine and mepivacaine  Epinephrine added: 3.75 mcg/mL (1/300,000)  Additional Notes  VSS.  [facility] RN monitoring vitals throughout procedure.  Patient tolerated procedure well.

## 2017-11-06 NOTE — H&P (VIEW-ONLY)
See my prior note; review of systems, family history and social history are   unchanged.    HISTORY OF PRESENT ILLNESS:  A 76-year-old female with end-stage renal disease,   well known to me, status post:  1.  Acuseal revision, left femoral AV graft, 08/02/2016.  2.  Prior multiple interventions of right graft including angioplasty in 2017   and 2016.  3.  Prior stent placement in the left iliac artery and angioplasty of the left   SFA, 03/10/2014.  4.  Original left femoral AV graft placement, 03/12/2008.  5.  Upper extremity arteriogram and tibioperoneal stent placement in July 2016   after TAVR (Dr. Dovre).    She now returns for evaluation of her L thigh AV graft.   There is erosion of her AV graft near the proximal anastomosis with exposed graft. She states she does have intermittent subjective chills but no mukund fever.  There is intermittent serosanguinous drainage from the eroded graft.   She is otherwise at her baseline health.    PMH:    1. Paroxysmal Afib after last general anesthesia  2.  Aortic stenosis s/p TAVR    PSH:  As above    Meds: See Epic    Allergies: SEE EPIC    PHYSICAL EXAMINATION:  Lungs clr  CV: RRR  VITAL SIGNS:  See nursing note.  EXTREMITIES:  Left leg shows the Acuseal graft with erosion through the skin near proximal revision anastomosis. No purulence or erythema noted around exposed area of the skin. There is a soft thrill and no   Pulsatility over graft.     IMAGING:  Duplex shows decreased flow rate to 0.9L from 1.4L with elevated velocity near outflow anastomosis.  liters.    ASSESSMENT:    Erosion of new revised AV graft without obvious infection. US evidence of outflow stenosis.     PLAN:    - L Femoral AV graft revision with possible PTA outflow tract on Monday 1/6/17  REGIONAL/Femoral  BLOCK    I have explained the risks, benefits and alternatives for this procedure in detail.  The patients voices understanding and all questions have be answered, and agrees to proceed with  the procedure.    CORA Espinosa III, MD, FACS  Professor and Chief, Vascular and Endovascular Surgery

## 2017-11-06 NOTE — INTERVAL H&P NOTE
The patient has been examined and the H&P has been reviewed:    I have personally examined the patient, and there are no changes since the H and P was written.    Anesthesia/Surgery risks, benefits and alternative options discussed and understood by patient/family.          Active Hospital Problems    Diagnosis  POA    AV graft malfunction [T82.590A]  Yes      Resolved Hospital Problems    Diagnosis Date Resolved POA   No resolved problems to display.

## 2017-11-06 NOTE — ANESTHESIA PROCEDURE NOTES
Obturator Nerve Single Injection Block    Patient location during procedure: OR    Reason for block: primary anesthetic   Diagnosis: end stage renal disease   Start time: 11/6/2017 11:20 AM  Timeout: 11/6/2017 11:20 AM   End time: 11/6/2017 11:25 AM  Staffing  Anesthesiologist: JN DE SANTIAGO  Performed: anesthesiologist   Preanesthetic Checklist  Completed: patient identified, site marked, surgical consent, pre-op evaluation, timeout performed, IV checked, risks and benefits discussed and monitors and equipment checked  Peripheral Block  Patient position: supine  Prep: ChloraPrep  Patient monitoring: cardiac monitor, heart rate, continuous pulse ox, continuous capnometry and frequent blood pressure checks  Block type: fascia iliaca (Supra Inguinal )  Laterality: left  Injection technique: single shot  Needle  Needle type: Stimuplex   Needle gauge: 22 G  Needle length: 2 in  Needle localization: ultrasound guidance and anatomical landmarks   -ultrasound image captured on disc.  Assessment  Injection assessment: local visualized surrounding nerve, negative parasthesia and negative aspiration  Paresthesia pain: none  Heart rate change: no  Slow fractionated injection: yes  Medications:  Bolus administered: 15 mL of 0.25 ropivacaine  Epinephrine added: 3.75 mcg/mL (1/300,000)  Additional Notes  VSS. See [facility] nurse for vitals. Patient tolerated the block well.

## 2017-11-06 NOTE — DISCHARGE INSTRUCTIONS
Sedación para procedimiento (adulto)  A usted le jones dado medicamentos intravenosos para sedarlo sal carroll procedimiento de hoy. Es probable que le hayan dado un medicamento contra el dolor y otro para dormir. La mayor parte del efecto de estos medicamentos ya ha desaparecido, karen es posible que continúe teniendo somnolencia sal las próximas 6-8 horas.  Cuidados en la casa  · Es importante que haya un adulto responsable a carroll lado sal las próximas ocho horas para vigilar si se produce un empeoramiento de carroll estado.  · No tome medicamentos orales contra el dolor o para dormir sal las próximas cuatro horas, ya que esto puede reaccionar con los medicamentos que le dieron en el hospital y provocar rosina respuesta mucho más juliann que la habitual.  · No rama nada de alcohol sal las próximas 24 horas.  · No maneje ni opere maquinaria peligrosa, ni tampoco tome decisiones importantes de negocios o personales sal las siguientes 24 horas.   Visita de control  Programe rosina visita de control con carroll médico o con brandie centro si no se siente sven despierto y no ha recuperado carroll nivel normal de actividad en un plazo de doce horas.  ¿Cuándo debe buscar atención médica?  Llame de inmediato al proveedor de atención médica si ocurre cualquiera de las siguientes situaciones:  · Somnolencia (adormecimiento) que va en aumento  · Debilidad o mareo en aumento  · Vómito persistente  · Si no pueden despertarlo  Date Last Reviewed: 5/22/2014  © 4807-6290 [employer]. [address] [place] [address], [place] [address]. Todos los derechos reservados. Esta información no pretende sustituir la atención médica profesional. Sólo carroll médico puede diagnosticar y tratar un problema de spenser.

## 2017-11-06 NOTE — ANESTHESIA POSTPROCEDURE EVALUATION
"Anesthesia Post Evaluation    Patient: Jerel Howard    Procedure(s) Performed: Procedure(s) (LRB):  Left thigh AV graft REVISION, fistulogram (Left)    Final Anesthesia Type: general  Patient location during evaluation: PACU  Patient participation: Yes- Able to Participate  Level of consciousness: awake and alert  Post-procedure vital signs: reviewed and stable  Pain management: adequate  Airway patency: patent  PONV status at discharge: No PONV  Anesthetic complications: no      Cardiovascular status: blood pressure returned to baseline and stable  Respiratory status: unassisted  Hydration status: euvolemic  Follow-up not needed.        Visit Vitals  BP (!) 138/50 (BP Location: Right arm, Patient Position: Lying)   Pulse 78   Temp 36.4 °C (97.5 °F) (Temporal)   Resp 14   Ht 5' 1" (1.549 m)   Wt 56.7 kg (125 lb)   SpO2 96%   Breastfeeding? No   BMI 23.62 kg/m²       Pain/Zurdo Score: Pain Assessment Performed: Yes (11/6/2017  3:27 PM)  Presence of Pain: denies (11/6/2017  3:27 PM)  Pain Rating Prior to Med Admin: 7 (11/6/2017  2:55 PM)  Zurdo Score: 10 (11/6/2017  3:27 PM)      "

## 2017-11-06 NOTE — ANESTHESIA PROCEDURE NOTES
Suprainguinal Fascia Iliaca Single Injection Block    Patient location during procedure: OR    Reason for block: primary anesthetic   Diagnosis: end stage renal disease   Start time: 11/6/2017 11:20 AM  Timeout: 11/6/2017 11:20 AM   End time: 11/6/2017 11:25 AM  Surgery related to: AV fistula  Staffing  Anesthesiologist: JN DE SANTIAGO  Performed: anesthesiologist   Preanesthetic Checklist  Completed: patient identified, site marked, surgical consent, pre-op evaluation, timeout performed, IV checked, risks and benefits discussed and monitors and equipment checked  Peripheral Block  Patient position: supine  Prep: ChloraPrep  Patient monitoring: heart rate, cardiac monitor, continuous pulse ox, continuous capnometry and frequent blood pressure checks  Block type: obturator  Laterality: left  Injection technique: single shot  Needle  Needle type: Stimuplex   Needle gauge: 22 G  Needle length: 2 in  Needle localization: anatomical landmarks and ultrasound guidance   -ultrasound image captured on disc.  Assessment  Injection assessment: negative aspiration, negative parasthesia and local visualized surrounding nerve  Paresthesia pain: none  Heart rate change: no  Slow fractionated injection: yes  Medications:  Bolus administered: 10 mL of 1.0 mepivacaine  Epinephrine added: 3.75 mcg/mL (1/300,000)  Additional Notes  VSS.  [facility] RN monitoring vitals throughout procedure.  Patient tolerated procedure well.

## 2017-11-06 NOTE — TRANSFER OF CARE
"Anesthesia Transfer of Care Note    Patient: Jerel Howard    Procedure(s) Performed: Procedure(s) (LRB):  Left thigh AV graft REVISION, fistulogram (Left)    Patient location: PACU    Anesthesia Type: general    Transport from OR: Transported from OR on 6-10 L/min O2 by face mask with adequate spontaneous ventilation    Post pain: adequate analgesia    Post assessment: no apparent anesthetic complications    Post vital signs: stable    Level of consciousness: awake    Nausea/Vomiting: no nausea/vomiting    Complications: none    Transfer of care protocol was followed      Last vitals:   Visit Vitals  BP (!) 129/52   Pulse (!) 59   Temp 36.6 °C (97.8 °F) (Oral)   Resp 20   Ht 5' 1" (1.549 m)   Wt 56.7 kg (125 lb)   SpO2 99%   Breastfeeding? No   BMI 23.62 kg/m²     "

## 2017-11-07 VITALS
TEMPERATURE: 98 F | WEIGHT: 125 LBS | HEIGHT: 61 IN | DIASTOLIC BLOOD PRESSURE: 50 MMHG | RESPIRATION RATE: 14 BRPM | BODY MASS INDEX: 23.6 KG/M2 | SYSTOLIC BLOOD PRESSURE: 138 MMHG | OXYGEN SATURATION: 96 % | HEART RATE: 78 BPM

## 2017-11-07 NOTE — OP NOTE
[facility]  Vascular Surgery  Operative Note    SUMMARY     Date of Procedure: 11/6/2017     Procedure: 1.  Revision, L femoral AVG with interposition 6 mm PTFE    2. Partial excision, L femoral AVF      Surgeon(s) and Role:     * CEE Espinosa III, MD - Primary     * Leidy Beaver MD - Fellow    Assisting Surgeon: None    Pre-Operative Diagnosis: AV graft malfunction, subsequent encounter [T82.590D]    Post-Operative Diagnosis: Post-Op Diagnosis Codes:     * AV graft malfunction, subsequent encounter [T82.590D]    Anesthesia: Regional    Indication for operation: 78 yo F with ESRD on HD with exposed L femoral AVG.    Description of the Findings of the Procedure: incorporated left femoral arterial anastomosis, thrill in AV graft postprocedure      Complications: No    Estimated Blood Loss (EBL): 75 mL         Implants:   Implant Name Type Inv. Item Serial No.  Lot No. LRB No. Used   GRAFT STD STR STRETCH [id number] - [id number]   GRAFT STD STR STRETCH [id number]   [employer] [id number] Left 1       Specimens:   Specimen (12h ago through future)    None                  Condition: Good    Disposition: PACU - hemodynamically stable.    Operation in detail: Informed consent was obtained and patient was brought to the operating room and placed in supine position.  Anesthesia performed a perineural femoral block.  Patient received IV antibiotics.  Left thigh was prepped and draped in sterile fashion.  Patient did require general anesthesia secondary to incomplete block.  Oblique incision was made over proximal arterial limb.  Bovie electrocautery was used to dissect proximal to the arterial anastomosis; this aspect of the graft was noted to be well incorporated.  The proximal arterial limb was isolated circumferentially.  A oblique counter incision was made approximately 4 cm on the arterial limb distal to the exposed arterial limb.  Graft was circumferentially isolated.  Patient was given  IV heparin.  Femoral AV graft was clamped at proximal exposure and distal exposure, graft in between clamps was then ligated.  In order to excise graft we circumferentially isolated graft from the exposed graft portion proximally and distally, by doing this we were able to fully excise graft to include the exposed graft.  We then identified the arterial limb of patient's prior graft in the medial aspect of exposed graft wound and circumferentially dissected this limb free, thus excising the arterial limb of the prior graft.  A 6x10 PTFE interposition graft was tunneled lateral to the prior femoral AVG.  This was anastomosed to the proximal arterial limb in end to end fashion with 6-0 Goretex suture.  The medial aspect of the prior femoral limb did have significant calcification which required 4-0 prolene sutures interposed between the 6-0 Gortex sutures.  At completion of arterial anastomosis proximal clamp was removed and replaced distal to the anastomosis.  Attention then turned to the distal anastomosis of new PTFE graft to prior Acuseal graft in end to end fashion with 6-0 gortex sutures.  At completion graft was noted to have a pulsatile thrill.  Extensive hemostasis was obtained in wound beds.  Skin was excised from prior exposed graft wound.  Wound beds were copiously irrigated with saline.  Incisions over graft were closed with 3-0 Vicryl followed by 3-0 Monocryl sutures.  Skin was reapproximated with 3-0 horizontal nylon mattress sutures.  The wound with prior exposed graft was closed with deep 3-0 Monocryl sutures with skin reapproximated with 3-0 interrupted horizontal mattress sutures.  Incisions were covered with sterile dressings.  Patient was extubated and taken to PACU in stable condition.

## 2017-11-07 NOTE — ADDENDUM NOTE
Addendum  created 11/07/17 1018 by Shonda Turner MD    Anesthesia Intra Blocks edited, Sign clinical note

## 2017-11-17 NOTE — ADDENDUM NOTE
Addendum  created 11/17/17 1503 by Suzette Myers MD    Anesthesia Intra Blocks edited, LDA updated via procedure documentation, Pend clinical note, Sign clinical note

## 2017-12-15 ENCOUNTER — HOSPITAL ENCOUNTER (OUTPATIENT)
Dept: VASCULAR SURGERY | Facility: CLINIC | Age: 77
Discharge: HOME | End: 2017-12-15
Attending: SURGERY
Payer: MEDICARE

## 2017-12-15 ENCOUNTER — OFFICE VISIT (OUTPATIENT)
Dept: VASCULAR SURGERY | Facility: CLINIC | Age: 77
End: 2017-12-15
Payer: MEDICARE

## 2017-12-15 VITALS
TEMPERATURE: 98 F | HEIGHT: 61 IN | RESPIRATION RATE: 18 BRPM | WEIGHT: 121.5 LBS | DIASTOLIC BLOOD PRESSURE: 79 MMHG | BODY MASS INDEX: 22.94 KG/M2 | SYSTOLIC BLOOD PRESSURE: 195 MMHG | HEART RATE: 78 BPM

## 2017-12-15 DIAGNOSIS — N18.6 ESRD ON DIALYSIS: ICD-10-CM

## 2017-12-15 DIAGNOSIS — Z99.2 ESRD ON DIALYSIS: ICD-10-CM

## 2017-12-15 DIAGNOSIS — T82.590A AV GRAFT MALFUNCTION: ICD-10-CM

## 2017-12-15 DIAGNOSIS — T82.858A STENOSIS OF ARTERIOVENOUS DIALYSIS FISTULA, INITIAL ENCOUNTER: ICD-10-CM

## 2017-12-15 DIAGNOSIS — T82.590D MALFUNCTION OF ARTERIOVENOUS GRAFT, SUBSEQUENT ENCOUNTER: Primary | ICD-10-CM

## 2017-12-15 DIAGNOSIS — T82.591S: ICD-10-CM

## 2017-12-15 PROCEDURE — 99999 PR PBB SHADOW E&M-EST. PATIENT-LVL III: CPT | Mod: PBBFAC,,, | Performed by: SURGERY

## 2017-12-15 PROCEDURE — 99024 POSTOP FOLLOW-UP VISIT: CPT | Mod: ,,, | Performed by: SURGERY

## 2017-12-15 PROCEDURE — 93990 DOPPLER FLOW TESTING: CPT | Mod: 26,S$PBB,, | Performed by: SURGERY

## 2017-12-15 PROCEDURE — 93990 DOPPLER FLOW TESTING: CPT | Mod: PBBFAC | Performed by: SURGERY

## 2017-12-15 PROCEDURE — 99213 OFFICE O/P EST LOW 20 MIN: CPT | Mod: PBBFAC | Performed by: SURGERY

## 2017-12-15 RX ORDER — NEOMYCIN SULFATE, POLYMYXIN B SULFATE AND DEXAMETHASONE 3.5; 10000; 1 MG/ML; [USP'U]/ML; MG/ML
SUSPENSION/ DROPS OPHTHALMIC
COMMUNITY
Start: 2017-10-13

## 2017-12-15 RX ORDER — LIDOCAINE HYDROCHLORIDE 10 MG/ML
1 INJECTION, SOLUTION EPIDURAL; INFILTRATION; INTRACAUDAL; PERINEURAL ONCE
Status: CANCELLED | OUTPATIENT
Start: 2017-12-15 | End: 2017-12-15

## 2017-12-15 RX ORDER — MUPIROCIN 20 MG/G
OINTMENT TOPICAL
Status: CANCELLED | OUTPATIENT
Start: 2017-12-15

## 2017-12-15 RX ORDER — AMLODIPINE BESYLATE 10 MG/1
TABLET ORAL
COMMUNITY
Start: 2017-11-28

## 2017-12-15 NOTE — PROGRESS NOTES
See my prior note; review of systems, family history and social history are   unchanged.    HISTORY OF PRESENT ILLNESS:  A 76-year-old female with end-stage renal disease,   well known to me, status post:  1.  Acuseal revision, left femoral AV graft, 08/02/2017.  2.  Prior multiple interventions of right graft including angioplasty in 2017   and 2016.  3.  Prior stent placement in the left iliac artery and angioplasty of the left   SFA, 03/10/2014.  4.  Original left femoral AV graft placement, 03/12/2008.  5.  Upper extremity arteriogram and tibioperoneal stent placement in July 2016   after TAVR (Dr. Dover).  6. Accuseal interposition graft revision Left femoral AVG due to erosion 11/06/17    She now returns for evaluation of her L thigh AV graft after revision.   She states no issues with Hd. Denies fever/chills/drainage from incisions.   She is otherwise at her baseline health.    PMH:    1. Paroxysmal Afib after last general anesthesia  2.  Aortic stenosis s/p TAVR    PSH:  As above    Meds: Eliquis.  See EPIC for full list    Allergies: SEE EPIC    PHYSICAL EXAMINATION:  Lungs clr  CV: RRR  VITAL SIGNS:  See nursing note.  EXTREMITIES:  L Leg shows well healed incisions.  No signif thrill or pulsatility    IMAGING:    HD access DUS: patent left femoral AVG,  at proximal anastomosis of interposition graft. Mid graft flow volume in 304 ml/min     ASSESSMENT:    Recent revision of left thigh AVG with interposition graft. Well healed. Suture removed in clinic today.   HD access DUS showing concerning flow volume of 300 ml/min.   Will need fistulogram.      PLAN:    1. Will plan on antegrade fistulogram 12/20/17 in cath lab around 2 pm  2. Continue eliquis through the procedure.     Fernie Quintero MD  Vascular/Endovascular Surgery Fellow    VASCULAR STAFF    I have personally taken the history and examined this patient and agree with the resident's note as stated above    CORA Espinosa III, MD,  MERNA  Professor and Chief, Vascular and Endovascular Surgery

## 2017-12-19 ENCOUNTER — TELEPHONE (OUTPATIENT)
Dept: VASCULAR SURGERY | Facility: CLINIC | Age: 77
End: 2017-12-19

## 2017-12-19 NOTE — NURSING
Spoke with patient daughter Mena Howard explained to her that patient is to arrive at the 3rd floor [facility] Cardiology Dept on tomorrow 12/20/2017  for 11:30 am for surgery with Dr. CEE Espinosa. Pre Op instructions reinforced and advised per Dr Espinosa patient is to continue her Eliquis. Patient emre San verbalized understanding. GSL/LPN

## 2017-12-20 ENCOUNTER — HOSPITAL ENCOUNTER (OUTPATIENT)
Facility: HOSPITAL | Age: 77
Discharge: HOME | End: 2017-12-20
Attending: SURGERY | Admitting: SURGERY
Payer: MEDICARE

## 2017-12-20 VITALS
HEIGHT: 60 IN | BODY MASS INDEX: 23.75 KG/M2 | DIASTOLIC BLOOD PRESSURE: 77 MMHG | WEIGHT: 121 LBS | OXYGEN SATURATION: 94 % | HEART RATE: 71 BPM | TEMPERATURE: 98 F | SYSTOLIC BLOOD PRESSURE: 179 MMHG | RESPIRATION RATE: 18 BRPM

## 2017-12-20 DIAGNOSIS — T82.591S: ICD-10-CM

## 2017-12-20 DIAGNOSIS — N18.6 ESRD ON DIALYSIS: ICD-10-CM

## 2017-12-20 DIAGNOSIS — Z99.2 ESRD ON DIALYSIS: ICD-10-CM

## 2017-12-20 DIAGNOSIS — T82.858D ARTERIOVENOUS FISTULA STENOSIS, SUBSEQUENT ENCOUNTER: Primary | ICD-10-CM

## 2017-12-20 PROCEDURE — 63600175 PHARM REV CODE 636 W HCPCS

## 2017-12-20 PROCEDURE — C1894 INTRO/SHEATH, NON-LASER: HCPCS

## 2017-12-20 PROCEDURE — 25000003 PHARM REV CODE 250

## 2017-12-20 PROCEDURE — 99152 MOD SED SAME PHYS/QHP 5/>YRS: CPT | Mod: ,,, | Performed by: SURGERY

## 2017-12-20 PROCEDURE — 36902 INTRO CATH DIALYSIS CIRCUIT: CPT | Mod: 78,GC,, | Performed by: SURGERY

## 2017-12-20 RX ORDER — MUPIROCIN 20 MG/G
OINTMENT TOPICAL
Status: DISCONTINUED | OUTPATIENT
Start: 2017-12-20 | End: 2017-12-20 | Stop reason: HOSPADM

## 2017-12-20 RX ORDER — CEFAZOLIN SODIUM 1 G/3ML
2 INJECTION, POWDER, FOR SOLUTION INTRAMUSCULAR; INTRAVENOUS
Status: DISCONTINUED | OUTPATIENT
Start: 2017-12-20 | End: 2017-12-20 | Stop reason: HOSPADM

## 2017-12-20 RX ORDER — LIDOCAINE HYDROCHLORIDE 10 MG/ML
1 INJECTION, SOLUTION EPIDURAL; INFILTRATION; INTRACAUDAL; PERINEURAL ONCE
Status: DISCONTINUED | OUTPATIENT
Start: 2017-12-20 | End: 2017-12-20 | Stop reason: HOSPADM

## 2017-12-20 NOTE — PLAN OF CARE
Problem: Patient Care Overview  Goal: Plan of Care Review  Outcome: Ongoing (interventions implemented as appropriate)  Pt arrived to unit accompanied by family.  Pt Bengali speaking with daughter at bedside to translate.  Pre op orders and assessment initiated.  Pt remains npo.  Pt in no acute distress and verbalizes no complaints.  Will continue to monitor.

## 2017-12-20 NOTE — INTERVAL H&P NOTE
The patient has been examined and the H&P has been reviewed:    I concur with the findings and no changes have occurred since H&P was written.    Anesthesia/Surgery risks, benefits and alternative options discussed and understood by patient/family.          Active Hospital Problems    Diagnosis  POA    AV shunt malfunction, sequela [T82.591S]  Not Applicable      Resolved Hospital Problems    Diagnosis Date Resolved POA   No resolved problems to display.

## 2017-12-20 NOTE — BRIEF OP NOTE
[facility]  Brief Operative Note     SUMMARY     Surgery Date: 12/20/2017     Surgeon(s) and Role:     * CEE Espinosa III, MD - Primary    Assisting Surgeon: None    Pre-op Diagnosis:   AVF stenosis    Post-op Diagnosis:   same    PROCEDURES:    1.  PTA, perianastomotic L femoral AVG (6x20)  2. Drug-coated PTA, L venous anast (7x60 Lutonix)  3. Fistualgram  4. Conscious Sedation    Anesthesia: RN IV Sedation    Description of the findings of the procedure: >80% prox and distal stenosis, <10% residual    Findings/Key Components: as above    Estimated Blood Loss: <5cc         Specimens:   Specimen (12h ago through future)    None          Discharge Note    SUMMARY     Admit Date: 12/20/2017    Discharge Date and Time: 12/20/17    Hospital Course (synopsis of major diagnoses, care, treatment, and services provided during the course of the hospital stay): successful outpatient procedure    Final Diagnosis:  Stenosis, AVG    Disposition: home    Follow Up/Patient Instructions: Diet: renal  Act: ad alee  FU:  2 week with AVF duplex     DIALYSIS FACILITY TO REMOVE STITCHES IN 3-5 DAYS    Medications: pre-op

## 2017-12-20 NOTE — PLAN OF CARE
Problem: Patient Care Overview  Goal: Plan of Care Review  Outcome: Ongoing (interventions implemented as appropriate)  Pt transferred from cath lab via stretcher.  Pt's daughter called to bs.  Vss.  Post op orders and assessment initiated.  l leg fistula cdi.  0 bleed, 0 hematoma.  +thrill, + bruit.  Pt in no acute distress and verbalizes no complaints.  Pt aao, tolerating po well.

## 2017-12-20 NOTE — NURSING
Pt d/c'd to home per md orders.  Vss.  l leg site remains cdi  0 bleed, 0 hematoma.  Instructed pt and daughter on home medications, post procedure precautions and follow up visits.  Pt and daughter verbalizes understanding.  Pt in no acute distress and verbalizes no complaints.

## 2017-12-21 NOTE — OP NOTE
DATE OF PROCEDURE:  12/20/2017    PREOPERATIVE DIAGNOSIS:  AV graft stenosis, left femoral.    POSTOPERATIVE DIAGNOSIS:  AV graft stenosis, left femoral.    PROCEDURES:  1.  Angioplasty, kinsey-anastomotic femoral AV graft (6 x 20).  2.  Drug-coated angioplasty, femoral anastomotic stenosis (7 x 60 Lutonix).  3.  Left femoral fistulogram.  4.  Conscious sedation.    SURGEONS:  CORA Espinosa III, M.D.    ANESTHESIA:  RN-directed sedation and local infiltration.    INDICATIONS:  A 77-year-old female well known to me with end-stage renal   disease, with a new high-grade inflow kinsey-anastomotic stenosis with a flow rate   of only 300 by imaging.  Approximately six weeks ago, she underwent a body   revision of this graft  and all these incisions are well healed.    PROCEDURE IN DETAIL:  The patient was brought in the Cath Lab and placed in the   supine position.  After sterile prep and drape and infiltration of 1% lidocaine,   the left femoral graft was accessed with a micropuncture set in an antegrade   fashion.  Fistulogram discovered surprisingly greater than 95% anastomotic   stenosis on the venous end as well as a high-grade stenosis in the femoral vein   approximately 1 to 2 cm distal to it.  Antegrade fistulogram confirmed a   high-grade stenosis of the graft approximately 1 cm from the arterial   anastomosis and a modest stenosis at the anastomosis itself.  A stiff-angled   Glidewire was passed through the kinsey-anastomotic stenosis and a 6-sheath   placed.  A 6 x 20 balloon was brought in the field, prepped, placed over this   area, and inflated.  There was severe wasting that then fully effaced at 12   atmospheres and this was held for 90 seconds.  Completion fistulogram in this   area revealed less than 10% residual stenosis.    Attention was then directed towards the kinsey-venous anastomotic stenosis.    Second access was achieved in an antegrade position aimed at the venous   anastomosis with micropuncture  set.  This was followed by a stiff-angled   Glidewire and a second short 6-sheath.  This was initially dilated with a 7 x 40   Pickens balloon.  There was severe waisting and then fully effaced to 20   atmospheres and this was held for 90 seconds.  Initial completion fistulogram   revealed significant improvement in the stenosis of less than 20%.  Because of   the recurrent nature of this stenosis, it was elected to use a drug-coated   balloon.    A 7 x 60 Lutonix drug-coated balloon was brought in the field, prepped, and   placed over the stenotic area and inflated to 12 atmospheres and held for 2   minutes.  Final completion fistulogram revealed less than 10% residual stenosis   and brisk flow.    All catheters and guidewires were removed and hemostasis was achieved with the   Monocryl sutures.    I continuously monitored the patient's cardiopulmonary functions throughout the   case.  See nursing notes for dosing of the Versed and fentanyl.  Total sedation   time was 55 minutes.      TANIKA  dd: 12/20/2017 14:28:12 (CST)  td: 12/20/2017 18:13:24 (CST)  Doc ID   #[id number]  Job ID #[id number]    CC:

## 2018-01-12 ENCOUNTER — OFFICE VISIT (OUTPATIENT)
Dept: VASCULAR SURGERY | Facility: CLINIC | Age: 78
End: 2018-01-12
Payer: MEDICARE

## 2018-01-12 ENCOUNTER — HOSPITAL ENCOUNTER (OUTPATIENT)
Dept: VASCULAR SURGERY | Facility: CLINIC | Age: 78
Discharge: HOME | End: 2018-01-12
Attending: STUDENT IN AN ORGANIZED HEALTH CARE EDUCATION/TRAINING PROGRAM
Payer: MEDICARE

## 2018-01-12 VITALS
DIASTOLIC BLOOD PRESSURE: 55 MMHG | BODY MASS INDEX: 23.95 KG/M2 | HEIGHT: 60 IN | WEIGHT: 122 LBS | SYSTOLIC BLOOD PRESSURE: 119 MMHG | HEART RATE: 60 BPM | TEMPERATURE: 97 F

## 2018-01-12 DIAGNOSIS — T82.591S: ICD-10-CM

## 2018-01-12 DIAGNOSIS — Z99.2 ESRD ON DIALYSIS: ICD-10-CM

## 2018-01-12 DIAGNOSIS — Z99.2 ESRD ON DIALYSIS: Primary | ICD-10-CM

## 2018-01-12 DIAGNOSIS — Z99.2 ESRD (END STAGE RENAL DISEASE) ON DIALYSIS: Primary | ICD-10-CM

## 2018-01-12 DIAGNOSIS — N18.6 ESRD ON DIALYSIS: Primary | ICD-10-CM

## 2018-01-12 DIAGNOSIS — T82.858D ARTERIOVENOUS FISTULA STENOSIS, SUBSEQUENT ENCOUNTER: ICD-10-CM

## 2018-01-12 DIAGNOSIS — N18.6 ESRD ON DIALYSIS: ICD-10-CM

## 2018-01-12 DIAGNOSIS — N18.6 ESRD (END STAGE RENAL DISEASE) ON DIALYSIS: Primary | ICD-10-CM

## 2018-01-12 PROCEDURE — 99213 OFFICE O/P EST LOW 20 MIN: CPT | Mod: PBBFAC,25 | Performed by: SURGERY

## 2018-01-12 PROCEDURE — 99024 POSTOP FOLLOW-UP VISIT: CPT | Mod: S$PBB,,, | Performed by: SURGERY

## 2018-01-12 PROCEDURE — 99999 PR PBB SHADOW E&M-EST. PATIENT-LVL III: CPT | Mod: PBBFAC,,, | Performed by: SURGERY

## 2018-01-12 PROCEDURE — 93990 DOPPLER FLOW TESTING: CPT | Mod: 26,S$PBB,, | Performed by: SURGERY

## 2018-01-12 PROCEDURE — 93990 DOPPLER FLOW TESTING: CPT | Mod: PBBFAC | Performed by: SURGERY

## 2018-01-12 NOTE — PROGRESS NOTES
See my prior note; review of systems, family history and social history are   unchanged.    HISTORY OF PRESENT ILLNESS:  A 76-year-old female with end-stage renal disease,   well known to me, status post:    1. PTA, prox and distal L femoral aVG12/20/17  2. Accuseal interposition graft revision Left femoral AVG due to erosion 11/06/17  3.  Acuseal revision, left femoral AV graft, 08/02/2017.  4.  Prior multiple interventions of right graft including angioplasty in 2017   and 2016.  5.  Prior stent placement in the left iliac artery and angioplasty of the left   SFA, 03/10/2014.  6.  Original left femoral AV graft placement, 03/12/2008.  7.  Upper extremity arteriogram and tibioperoneal stent placement in July 2016   after TAVR (Dr. Dover).      She now returns.   She states no issues with Hd. Denies fever/chills/drainage from incisions.   She is otherwise at her baseline health.    PMH:    1. Paroxysmal Afib after last general anesthesia  2.  Aortic stenosis s/p TAVR    PSH:  As above    Meds: Eliquis.  See EPIC for full list    Allergies: SEE EPIC    PHYSICAL EXAMINATION:  Lungs clr  CV: RRR  VITAL SIGNS:  See nursing note.  EXTREMITIES:  L Leg shows well healed incisions.  Soft  Thrill (improved from prior)    IMAGING:    HD access DUS: patent left femoral AVG,  (prior 630) at proximal anastomosis of interposition graft. Mid graft flow volume much better at 1.1L ( prior 304 ml/min)     ASSESSMENT:      Much improved flow rates despite the PSV proximally being higher.  No clinical issues with using the AVF    PLAN:    1.  F/U 8 wks with repeat AVG duplex    CORA Espinosa III, MD, FACS  Professor and Chief, Vascular and Endovascular Surgery

## 2018-12-04 ENCOUNTER — TELEPHONE (OUTPATIENT)
Dept: VASCULAR SURGERY | Facility: CLINIC | Age: 78
End: 2018-12-04

## 2018-12-04 NOTE — TELEPHONE ENCOUNTER
appt was scheduled, pt wanted to see Dr. Myers and only wanted to see him. I oferred the pt to see Dr. chavez , but the daughter declined

## 2018-12-04 NOTE — TELEPHONE ENCOUNTER
----- Message from Mikala Underwood sent at 12/4/2018  8:00 AM CST -----  Contact: NP  NP called in about wanting to schedule appt for pt. Pt has an infected area that  will need to take care of before Friday. NP is trying to keep her out of ED      Pt can be reached at [phone]        TY

## 2018-12-07 ENCOUNTER — OFFICE VISIT (OUTPATIENT)
Dept: VASCULAR SURGERY | Facility: CLINIC | Age: 78
End: 2018-12-07
Payer: MEDICARE

## 2018-12-07 ENCOUNTER — HOSPITAL ENCOUNTER (OUTPATIENT)
Dept: VASCULAR SURGERY | Facility: CLINIC | Age: 78
Discharge: HOME | End: 2018-12-07
Attending: SURGERY
Payer: MEDICARE

## 2018-12-07 VITALS
HEIGHT: 60 IN | SYSTOLIC BLOOD PRESSURE: 186 MMHG | TEMPERATURE: 98 F | HEART RATE: 75 BPM | BODY MASS INDEX: 22.51 KG/M2 | DIASTOLIC BLOOD PRESSURE: 77 MMHG | WEIGHT: 114.63 LBS

## 2018-12-07 DIAGNOSIS — T82.9XXD COMPLICATION OF VASCULAR ACCESS FOR DIALYSIS, SUBSEQUENT ENCOUNTER: Primary | ICD-10-CM

## 2018-12-07 DIAGNOSIS — Z99.2 ESRD (END STAGE RENAL DISEASE) ON DIALYSIS: ICD-10-CM

## 2018-12-07 DIAGNOSIS — N18.6 ESRD (END STAGE RENAL DISEASE) ON DIALYSIS: ICD-10-CM

## 2018-12-07 PROCEDURE — 93990 DOPPLER FLOW TESTING: CPT | Mod: 26,S$PBB,, | Performed by: SURGERY

## 2018-12-07 PROCEDURE — 99213 OFFICE O/P EST LOW 20 MIN: CPT | Mod: PBBFAC | Performed by: SURGERY

## 2018-12-07 PROCEDURE — 99999 PR PBB SHADOW E&M-EST. PATIENT-LVL III: CPT | Mod: PBBFAC,,, | Performed by: SURGERY

## 2018-12-07 PROCEDURE — 99214 OFFICE O/P EST MOD 30 MIN: CPT | Mod: S$PBB,,, | Performed by: SURGERY

## 2018-12-07 PROCEDURE — 93990 DOPPLER FLOW TESTING: CPT | Mod: PBBFAC | Performed by: SURGERY

## 2018-12-07 RX ORDER — LOSARTAN POTASSIUM 100 MG/1
TABLET ORAL
COMMUNITY

## 2018-12-07 NOTE — PROGRESS NOTES
See my prior note; review of systems, family history and social history are   unchanged.    HISTORY OF PRESENT ILLNESS:  A 78-year-old female with end-stage renal disease,   well known to me, status post:    1. PTA, prox and distal L femoral aVG12/20/17  2. Accuseal interposition graft revision Left femoral AVG due to erosion 11/06/17  3.  Acuseal revision, left femoral AV graft, 08/02/2017.  4.  Prior multiple interventions of right graft including angioplasty in 2017   and 2016.  5.  Prior stent placement in the left iliac artery and angioplasty of the left   SFA, 03/10/2014.  6.  Original left femoral AV graft placement, 03/12/2008.  7.  Upper extremity arteriogram and tibioperoneal stent placement in July 2016   after TAVR (Dr. Dover).    Have not seen her in 10 months.   Her b/c concern about focal L femoral AVG infection.  No fever/malaise    PMH:    1. Paroxysmal Afib after last general anesthesia  2.  Aortic stenosis s/p TAVR    PSH:  As above    Meds: Eliquis. Just started on opal Ab and bacitracin ointment.  See EPIC for full list    Allergies: SEE EPIC    PHYSICAL EXAMINATION:  Lungs clr  CV: RRR  VITAL SIGNS:  See nursing note.  EXTREMITIES:  L Leg shows puncate (< 1mm) opening @ apex of AVG, modest purulent drainage.  No erythema, no drainage.  Moderate pusatility    IMAGING:    HD access DUS: patent left femoral AVG,  (prior  877 (prior 630) at proximal anastomosis of interposition graft. Mid graft flow volume much better at 1.5 (prior 1.1L ( prior 304 ml/min)     ASSESSMENT:  Focal L femoral kinsey-graft purulence @ apex     PLAN:    1. Agree with Ab and topical agents  2. FU in 2 wks.   If does not heal/imporve, will require partial AVF excision    CORA Espinosa III, MD, FACS  Professor and Chief, Vascular and Endovascular Surgery

## 2018-12-21 ENCOUNTER — OFFICE VISIT (OUTPATIENT)
Dept: VASCULAR SURGERY | Facility: CLINIC | Age: 78
End: 2018-12-21
Payer: MEDICARE

## 2018-12-21 VITALS
HEIGHT: 62 IN | WEIGHT: 116.88 LBS | BODY MASS INDEX: 21.51 KG/M2 | SYSTOLIC BLOOD PRESSURE: 199 MMHG | HEART RATE: 79 BPM | TEMPERATURE: 98 F | DIASTOLIC BLOOD PRESSURE: 78 MMHG

## 2018-12-21 DIAGNOSIS — N18.6 ESRD (END STAGE RENAL DISEASE) ON DIALYSIS: Primary | ICD-10-CM

## 2018-12-21 DIAGNOSIS — N18.6 ESRD (END STAGE RENAL DISEASE) ON DIALYSIS: ICD-10-CM

## 2018-12-21 DIAGNOSIS — N18.6 ESRD (END STAGE RENAL DISEASE): ICD-10-CM

## 2018-12-21 DIAGNOSIS — Z01.818 PRE-OP EVALUATION: Primary | ICD-10-CM

## 2018-12-21 DIAGNOSIS — T82.591S: ICD-10-CM

## 2018-12-21 DIAGNOSIS — Z99.2 ESRD (END STAGE RENAL DISEASE) ON DIALYSIS: ICD-10-CM

## 2018-12-21 DIAGNOSIS — Z99.2 ESRD (END STAGE RENAL DISEASE) ON DIALYSIS: Primary | ICD-10-CM

## 2018-12-21 PROCEDURE — 99213 OFFICE O/P EST LOW 20 MIN: CPT | Mod: PBBFAC | Performed by: SURGERY

## 2018-12-21 PROCEDURE — 99214 OFFICE O/P EST MOD 30 MIN: CPT | Mod: S$PBB,,, | Performed by: SURGERY

## 2018-12-21 PROCEDURE — 99999 PR PBB SHADOW E&M-EST. PATIENT-LVL III: CPT | Mod: PBBFAC,,, | Performed by: SURGERY

## 2018-12-21 RX ORDER — SODIUM CHLORIDE 0.9 % (FLUSH) 0.9 %
5 SYRINGE (ML) INJECTION
Status: CANCELLED | OUTPATIENT
Start: 2018-12-21

## 2018-12-21 RX ORDER — MUPIROCIN 20 MG/G
OINTMENT TOPICAL
Status: CANCELLED | OUTPATIENT
Start: 2018-12-21

## 2018-12-21 RX ORDER — LIDOCAINE HYDROCHLORIDE 10 MG/ML
1 INJECTION, SOLUTION EPIDURAL; INFILTRATION; INTRACAUDAL; PERINEURAL ONCE
Status: CANCELLED | OUTPATIENT
Start: 2018-12-21 | End: 2018-12-21

## 2018-12-21 NOTE — PROGRESS NOTES
See my prior note; review of systems, family history and social history are   unchanged.    HISTORY OF PRESENT ILLNESS:  A 78-year-old female with end-stage renal disease,   well known to me, status post:    1. PTA, prox and distal L femoral aVG12/20/17  2. Accuseal interposition graft revision Left femoral AVG due to erosion 11/06/17  3.  Acuseal revision, left femoral AV graft, 08/02/2017.  4.  Prior multiple interventions of right graft including angioplasty in 2017   and 2016.  5.  Prior stent placement in the left iliac artery and angioplasty of the left   SFA, 03/10/2014.  6.  Original left femoral AV graft placement, 03/12/2008.  7.  Upper extremity arteriogram and tibioperoneal stent placement in July 2016   after TAVR (Dr. Dover).    This is a 2 wk f/u for focal L femoral AVG drainage.  She reports that it has imporved.  Denies fever/chills    PMH:    1. Paroxysmal Afib after last general anesthesia  2.  Aortic stenosis s/p TAVR    PSH:  As above    Meds: Eliquis. Just started on oral Ab and bacitracin ointment.  See EPIC for full list    Allergies: SEE EPIC    PHYSICAL EXAMINATION:  Lungs clr  CV: RRR  VITAL SIGNS:  See nursing note.  EXTREMITIES:  L Leg shows puncate (< 1mm) opening @ apex of AVG, modest purulent drainage.  No erythema, no drainage.  Moderate pulsatility.  This appears unchaged       IMAGING:    HD access DUS: patent left femoral AVG,  (prior  877 (prior 630) at proximal anastomosis of interposition graft. Mid graft flow volume much better at 1.5 (prior 1.1L ( prior 304 ml/min)     ASSESSMENT:  Focal L femoral kinsey-graft purulence @ apex, not resolved.    It is VERY unlikely this will resolve without excision.   She is not ready to commit to this now.  In looking at the prior fistulagrams, the 10 y/o components of the original AVG are quite calcified (arterial end) and with multiple stenosis (venous end).   She needs a new AVG     PLAN:    1. NEW RIGHT femoral AVG, and segmental  excision of LEFT femoral AVF  2. Accuseal for R AVG  3. General    In the unlikely event that the L femoral AVG heals completely, would cancel    I have explained the risks, benefits and alternatives for this procedure in detail.  The patients voices understanding and all questions have be answered, and agrees to proceed with the procedure.    CORA Espinosa III, MD, FACS  Professor and Chief, Vascular and Endovascular Surgery

## 2019-01-09 ENCOUNTER — TELEPHONE (OUTPATIENT)
Dept: VASCULAR SURGERY | Facility: CLINIC | Age: 79
End: 2019-01-09

## 2019-01-09 DIAGNOSIS — N18.6 ESRD (END STAGE RENAL DISEASE) ON DIALYSIS: Primary | ICD-10-CM

## 2019-01-09 DIAGNOSIS — Z99.2 ESRD (END STAGE RENAL DISEASE) ON DIALYSIS: Primary | ICD-10-CM

## 2019-01-09 NOTE — TELEPHONE ENCOUNTER
Contacted patient's daughter in response to message. States patient has decided to cancel case due to fistula functioning well at this time after being treated for infection. States she will have mother FU with Dr. Espinosa at a later date. Notified daughter this will be fine. Will schedule FU appt and place appt letter in mail.----- Message from Maddie Escamilla sent at 1/9/2019  4:22 PM CST -----  Contact: Patient's daughter   Patient will be cancelling surgery for tomorrow. Caller states they need to make a f/u appointment for a later date

## 2019-03-27 ENCOUNTER — TELEPHONE (OUTPATIENT)
Dept: VASCULAR SURGERY | Facility: CLINIC | Age: 79
End: 2019-03-27

## 2019-03-27 NOTE — TELEPHONE ENCOUNTER
"Contacted patient's daughter Mena in response to message. Mena states her mother was at dialysis yesterday and was told by nurse that she had "pus" coming from her access and that she needed to see her doctor at the hospital. States she is calling today to schedule an appt. Notified daughter patient needed to be seen in ED today due to purulent drainage coming from her access. Daughter states mother is not having fever. Explained to daughter the purulent drainage is an indication of a possible infection, and an infection in a dialysis graft or fistula can be very serious. Explained that next available appt with Dr. Espinosa is not until Friday 3/29/19 and that she should be evaluated in ED today. Daugther verbalized understanding and asks if mother can also have appt to see Dr. Espinosa if ED staff tell her to FU in clinic. Appt scheduled with Dr. Espinosa and with vascular lab, daughter verified. Underscored importance of patient being evaluated in ED today, daughter verbalized understanding.----- Message from Graciela Hill sent at 3/27/2019  1:49 PM CDT -----  Contact: Pt.'s Daughter  [phone]   Needs Advice    Reason for call:   states she would like to speak with nurse in reference to her mom having puss coming out around access port and would like to know what can be done         Communication Preference:  Pt.'s Daughter  [phone]     Additional Information:    Thank You :)       "

## 2019-04-03 ENCOUNTER — TELEPHONE (OUTPATIENT)
Dept: VASCULAR SURGERY | Facility: CLINIC | Age: 79
End: 2019-04-03

## 2019-04-03 NOTE — TELEPHONE ENCOUNTER
Contacted patient's daughter Mena to determine why patient did not report to clinic appt with Dr. Espinosa on 3/29/19 or report to ED for purulent drainage from HD access site. States her sister took patient to vascular center at [facility] in [place] and was told she did not have purulent drainage coming from site. States sutures were also removed. States at this time her mother would not like to reschedule surgery for ligation of femoral AVG and graft creation. Instructed daughter to call for any problems. Daughter verbalized understanding.

## 2025-02-15 NOTE — BRIEF OP NOTE
[facility]  Brief Operative Note     SUMMARY     Surgery Date: 8/2/2017     Surgeon(s) and Role:     * Jak Diop MD - Resident - Assisting     * CEE Espinosa III, MD - Primary        Pre-op Diagnosis:  Complications due to renal dialysis device, implant, and graft, initial encounter [T82.9XXA]    Post-op Diagnosis:  Post-Op Diagnosis Codes:     * Complications due to renal dialysis device, implant, and graft, initial encounter [T82.9XXA]    PROCEDURES:    1. Revision, L femoral AVG with 6mm Accuseal    Anesthesia: General    Description of the findings of the procedure: body revision, soft thrill    Findings/Key Components: as aove    Estimated Blood Loss:  10cc       Specimens:   Specimen (12h ago through future)    None          Discharge Note    SUMMARY     Admit Date: 8/2/2017    Discharge Date and Time: 8/2/17    Hospital Course (synopsis of major diagnoses, care, treatment, and services provided during the course of the hospital stay): successful outpatient procedure    Final Diagnosis: Post-Op Diagnosis Codes:     * Complications due to renal dialysis device, implant, and graft, initial encounter [T82.9XXA]    Disposition: home    Follow Up/Patient Instructions: Diet: renal  Act: ad alee  FU: 2 week with AVF duplex     Medications: pre-op+analgesics        
no

## (undated) DEVICE — STOCKINET 4INX48

## (undated) DEVICE — LOOP VESSEL BLUE MAXI

## (undated) DEVICE — TRAY MINOR GEN SURG

## (undated) DEVICE — DRAPE PLASTIC U 60X72

## (undated) DEVICE — APPLICATOR CHLORAPREP ORN 26ML

## (undated) DEVICE — SEE MEDLINE ITEM 153688

## (undated) DEVICE — SEE MEDLINE ITEM 152622

## (undated) DEVICE — ELECTRODE REM PLYHSV RETURN 9

## (undated) DEVICE — ADHESIVE DERMABOND ADVANCED

## (undated) DEVICE — SUT LIGACLIP SMALL XTRA

## (undated) DEVICE — DRESSING TRANS 4X4 TEGADERM

## (undated) DEVICE — GOWN SURGICAL X-LARGE

## (undated) DEVICE — CLIP MED TICALL

## (undated) DEVICE — SET DECANTER MEDICHOICE

## (undated) DEVICE — SUT SILK 2-0 SH 18IN BLACK

## (undated) DEVICE — DRESSING TRANS 2X2 TEGADERM

## (undated) DEVICE — SYR ONLY LUER LOCK 20CC

## (undated) DEVICE — BLADE SURG CARBON STEEL SZ11

## (undated) DEVICE — BOOT SUTURE AID

## (undated) DEVICE — SOL 9P NACL IRR PIC IL

## (undated) DEVICE — COVERS PROBE NR-48 STERILE

## (undated) DEVICE — SUT 3-0 12-18IN SILK

## (undated) DEVICE — SUT MCRYL PLUS 4-0 PS2 27IN

## (undated) DEVICE — SEE MEDLINE ITEM 157173

## (undated) DEVICE — GOWN SURG 2XL DISP TIE BACK

## (undated) DEVICE — DRESSING TELFA STRL 4X3 LF

## (undated) DEVICE — COVER LIGHT HANDLE 80/CA

## (undated) DEVICE — SUT 4-0 12-18IN SILK BLACK

## (undated) DEVICE — SUT 2-0 VICRYL / CT-1

## (undated) DEVICE — SOL NS 1000CC

## (undated) DEVICE — GOWN SMART IMP BREATHABLE XXLG

## (undated) DEVICE — SUT VICRYL 3-0 27 SH

## (undated) DEVICE — SUT MONOCRYL 3-0 SH U/D

## (undated) DEVICE — SPONGE GAUZE 16PLY 4X4

## (undated) DEVICE — SUT 2-0 12-18IN SILK

## (undated) DEVICE — SUT 2-0 VICRYL / SH (J417)